# Patient Record
Sex: FEMALE | Race: WHITE | NOT HISPANIC OR LATINO | Employment: PART TIME | URBAN - METROPOLITAN AREA
[De-identification: names, ages, dates, MRNs, and addresses within clinical notes are randomized per-mention and may not be internally consistent; named-entity substitution may affect disease eponyms.]

---

## 2017-02-23 ENCOUNTER — ALLSCRIPTS OFFICE VISIT (OUTPATIENT)
Dept: OTHER | Facility: OTHER | Age: 57
End: 2017-02-23

## 2017-03-13 ENCOUNTER — GENERIC CONVERSION - ENCOUNTER (OUTPATIENT)
Dept: OTHER | Facility: OTHER | Age: 57
End: 2017-03-13

## 2017-05-01 ENCOUNTER — ALLSCRIPTS OFFICE VISIT (OUTPATIENT)
Dept: OTHER | Facility: OTHER | Age: 57
End: 2017-05-01

## 2017-05-01 DIAGNOSIS — R60.0 LOCALIZED EDEMA: ICD-10-CM

## 2017-05-01 DIAGNOSIS — L98.9 DISORDER OF SKIN OR SUBCUTANEOUS TISSUE: ICD-10-CM

## 2017-05-12 ENCOUNTER — HOSPITAL ENCOUNTER (OUTPATIENT)
Dept: RADIOLOGY | Age: 57
Discharge: HOME | End: 2017-05-12
Attending: SURGERY
Payer: COMMERCIAL

## 2017-05-12 ENCOUNTER — GENERIC CONVERSION - ENCOUNTER (OUTPATIENT)
Dept: OTHER | Facility: OTHER | Age: 57
End: 2017-05-12

## 2017-05-12 DIAGNOSIS — L98.9 DISORDER OF SKIN OR SUBCUTANEOUS TISSUE: ICD-10-CM

## 2017-05-12 PROCEDURE — 73718 MRI LOWER EXTREMITY W/O DYE: CPT

## 2017-05-15 ENCOUNTER — ALLSCRIPTS OFFICE VISIT (OUTPATIENT)
Dept: OTHER | Facility: OTHER | Age: 57
End: 2017-05-15

## 2017-05-25 ENCOUNTER — ALLSCRIPTS OFFICE VISIT (OUTPATIENT)
Dept: OTHER | Facility: OTHER | Age: 57
End: 2017-05-25

## 2017-06-07 ENCOUNTER — HOSPITAL ENCOUNTER (OUTPATIENT)
Dept: ULTRASOUND IMAGING | Facility: HOSPITAL | Age: 57
Discharge: HOME | End: 2017-06-07
Payer: COMMERCIAL

## 2017-06-07 DIAGNOSIS — R60.0 LOCALIZED EDEMA: ICD-10-CM

## 2017-06-07 PROCEDURE — 93970 EXTREMITY STUDY: CPT

## 2017-06-08 ENCOUNTER — GENERIC CONVERSION - ENCOUNTER (OUTPATIENT)
Dept: OTHER | Facility: OTHER | Age: 57
End: 2017-06-08

## 2017-06-16 ENCOUNTER — ALLSCRIPTS OFFICE VISIT (OUTPATIENT)
Dept: OTHER | Facility: OTHER | Age: 57
End: 2017-06-16

## 2017-06-16 DIAGNOSIS — R22.42 LOCALIZED SWELLING, MASS AND LUMP, LEFT LOWER LIMB: ICD-10-CM

## 2017-06-16 DIAGNOSIS — R23.4 CHANGES IN SKIN TEXTURE: ICD-10-CM

## 2017-06-22 ENCOUNTER — APPOINTMENT (OUTPATIENT)
Dept: PHYSICAL THERAPY | Facility: CLINIC | Age: 57
End: 2017-06-22
Payer: COMMERCIAL

## 2017-06-22 DIAGNOSIS — R23.4 CHANGES IN SKIN TEXTURE: ICD-10-CM

## 2017-06-22 DIAGNOSIS — R22.42 LOCALIZED SWELLING, MASS AND LUMP, LEFT LOWER LIMB: ICD-10-CM

## 2017-06-22 PROCEDURE — 97140 MANUAL THERAPY 1/> REGIONS: CPT

## 2017-06-22 PROCEDURE — 97161 PT EVAL LOW COMPLEX 20 MIN: CPT

## 2017-06-22 PROCEDURE — 97014 ELECTRIC STIMULATION THERAPY: CPT

## 2017-06-28 ENCOUNTER — APPOINTMENT (OUTPATIENT)
Dept: PHYSICAL THERAPY | Facility: CLINIC | Age: 57
End: 2017-06-28
Payer: COMMERCIAL

## 2017-06-28 ENCOUNTER — GENERIC CONVERSION - ENCOUNTER (OUTPATIENT)
Dept: OTHER | Facility: OTHER | Age: 57
End: 2017-06-28

## 2017-06-30 ENCOUNTER — APPOINTMENT (OUTPATIENT)
Dept: PHYSICAL THERAPY | Facility: CLINIC | Age: 57
End: 2017-06-30
Payer: COMMERCIAL

## 2017-09-11 ENCOUNTER — GENERIC CONVERSION - ENCOUNTER (OUTPATIENT)
Dept: OTHER | Facility: OTHER | Age: 57
End: 2017-09-11

## 2017-10-06 ENCOUNTER — GENERIC CONVERSION - ENCOUNTER (OUTPATIENT)
Dept: OTHER | Facility: OTHER | Age: 57
End: 2017-10-06

## 2017-11-22 ENCOUNTER — ALLSCRIPTS OFFICE VISIT (OUTPATIENT)
Dept: OTHER | Facility: OTHER | Age: 57
End: 2017-11-22

## 2017-11-23 NOTE — PROGRESS NOTES
Assessment    1  Serous otitis media (381 4) (H65 90)    Plan  Serous otitis media    · Amoxicillin 500 MG Oral Capsule; TAKE 1 CAPSULE 3 TIMES DAILY    Discussion/Summary    Patient will be started on amoxicillin and may take ibuprofen or naproxen p r n  [place] Patient encouraged to drink plenty of fluids and rest  Patient to return to the office in 1 week or sooner p r n  [place] Possible side effects of new medications were reviewed with the patient/guardian today  The treatment plan was reviewed with the patient/guardian  The patient/guardian understands and agrees with the treatment plan      Chief Complaint  right ear pain      History of Present Illness  Ear Pain:   Marysol Francisco presents with complaints of right ear pain  Associated symptoms include otalgia,-- ear pressure,-- nasal congestion,-- sore throat-- and-- headache, but-- no ear plugging,-- no ear drainage,-- no decreased hearing,-- no ear pruritus,-- no fever,-- no cough,-- no swollen glands,-- no facial pain,-- no dental pain,-- no tinnitus,-- no vertigo,-- no loss of balance-- and-- no temporomandibular joint pain  HPI: Patient complains of right earache for the past 2 days  She admits to nasal congestion but denies any fever  No treatment by patient  Active Problems  1  Depression (311) (F32 9)   2  Fatigue (780 79) (R53 83)   3  Hammer toe of right foot (735 4) (M20 41)   4  Induration, skin (782 8) (R23 4)   5  Leg edema, left (782 3) (R60 0)   6  Lesion of lower extremity (709 9) (L98 9)   7  Lipoma (214 9) (D17 9)   8  Mass of left lower leg (782 2) (R22 42)   9  Snoring (786 09) (R06 83)   10  Tinea pedis of left foot (110 4) (B35 3)   11  Viral gastroenteritis (008 8) (A08 4)   12  Visit for screening mammogram (V76 12) (Z12 31)    Family History  Mother    1  Family history of arthritis (V17 7) (Z82 61)  Father    2   Family history of hypertension (V17 49) (Z82 49)    Social History   · Never a smoker   · No drug use   · Occasional alcohol use    Surgical History    1  History of Arthroplasty For Hammertoe   2  History of Knee Arthroscopy With Medial Meniscus Repair    Current Meds   1  ZyrTEC Allergy CAPS; Therapy: (Recorded:[id number]) to Recorded    Allergies  1  No Known Drug Allergies    Vitals   Recorded: 22Nov2017 03:17PM Recorded: 22Nov2017 03:03PM   Temperature  97 4 F   Heart Rate 72    Respiration 16    Systolic 876    Diastolic 80    Height  5 ft 6 in   Weight  249 lb    BMI Calculated  40 19   BSA Calculated  2 2       Physical Exam   Constitutional  General appearance: No acute distress, well appearing and well nourished  Eyes  Conjunctiva and lids: No swelling, erythema or discharge  Ears, Nose, Mouth, and Throat  External inspection of ears and nose: Normal    Otoscopic examination: Abnormal   The left tympanic membrane was normal  The right external canal was normal  The left external canal was normal -- Right TM dull  Nasal mucosa, septum, and turbinates: Abnormal   There was a mucoid discharge from both nares  The bilateral nasal mucosa was edematous  Oropharynx: Normal with no erythema, edema, exudate or lesions  Pulmonary  Respiratory effort: No increased work of breathing or signs of respiratory distress  Auscultation of lungs: Clear to auscultation  Cardiovascular  Auscultation of heart: Normal rate and rhythm, normal S1 and S2, without murmurs  Examination of extremities for edema and/or varicosities: Normal    Abdomen  Abdomen: Non-tender, no masses  Lymphatic  Palpation of lymph nodes in neck: No lymphadenopathy  Musculoskeletal  Gait and station: Normal    Skin  Skin and subcutaneous tissue: Normal without rashes or lesions     Psychiatric  Orientation to person, place, and time: Normal    Mood and affect: Normal          Signatures   Electronically signed by : Isi Rios DO; Nov 22 2017  3:25PM EST                       (Author)

## 2018-01-13 VITALS
SYSTOLIC BLOOD PRESSURE: 108 MMHG | RESPIRATION RATE: 16 BRPM | TEMPERATURE: 97.5 F | DIASTOLIC BLOOD PRESSURE: 74 MMHG | HEIGHT: 66 IN | WEIGHT: 243 LBS | HEART RATE: 76 BPM | BODY MASS INDEX: 39.05 KG/M2

## 2018-01-13 VITALS
DIASTOLIC BLOOD PRESSURE: 72 MMHG | BODY MASS INDEX: 40.65 KG/M2 | TEMPERATURE: 97.7 F | HEIGHT: 65 IN | WEIGHT: 244 LBS | SYSTOLIC BLOOD PRESSURE: 100 MMHG

## 2018-01-14 VITALS
TEMPERATURE: 97.7 F | WEIGHT: 252 LBS | SYSTOLIC BLOOD PRESSURE: 130 MMHG | HEIGHT: 66 IN | BODY MASS INDEX: 40.5 KG/M2 | DIASTOLIC BLOOD PRESSURE: 82 MMHG

## 2018-01-14 VITALS
SYSTOLIC BLOOD PRESSURE: 130 MMHG | HEART RATE: 76 BPM | BODY MASS INDEX: 39.93 KG/M2 | TEMPERATURE: 98 F | RESPIRATION RATE: 20 BRPM | WEIGHT: 247.38 LBS | DIASTOLIC BLOOD PRESSURE: 84 MMHG

## 2018-01-14 VITALS
BODY MASS INDEX: 40.02 KG/M2 | DIASTOLIC BLOOD PRESSURE: 80 MMHG | RESPIRATION RATE: 16 BRPM | SYSTOLIC BLOOD PRESSURE: 112 MMHG | HEART RATE: 72 BPM | HEIGHT: 66 IN | WEIGHT: 249 LBS | TEMPERATURE: 97.4 F

## 2018-01-14 VITALS
HEIGHT: 66 IN | BODY MASS INDEX: 40.04 KG/M2 | SYSTOLIC BLOOD PRESSURE: 120 MMHG | DIASTOLIC BLOOD PRESSURE: 77 MMHG | WEIGHT: 249.13 LBS | HEART RATE: 61 BPM

## 2018-01-16 NOTE — MISCELLANEOUS
Message   Recorded as Task   Date: 05/12/2017 12:18 PM, Created By: Moriah Ballesteros   Task Name: Go to Result   Assigned To: [facility],Team   Regarding Patient: Rusty Gross, Status: Active   CommentArik Hall - 12 May 2017 12:18 PM     TASK CREATED  call results  no mass, maybe just bruise or inflammation  see back i office  Erica Beckett - 12 May 2017 1:13 PM     TASK EDITED  Patient call and results were given  Patient has a appointment on Monday 5/15/2017 at 3:15pm with Dr Josh Moreno to discuss MRI results  Active Problems    1  Depression (311) (F32 9)   2  Fatigue (780 79) (R53 83)   3  Hammer toe of right foot (735 4) (M20 41)   4  Lesion of lower extremity (709 9) (L98 9)   5  Lipoma (214 9) (D17 9)   6  Mass of left lower leg (782 2) (R22 42)   7  Snoring (786 09) (R06 83)   8  Tinea pedis of left foot (110 4) (B35 3)   9  Viral gastroenteritis (008 8) (A08 4)   10  Visit for screening mammogram (V76 12) (Z12 31)    Current Meds   1  ZyrTEC Allergy CAPS; Therapy: (Recorded:27Oct2015) to Recorded    Allergies    1   No Known Drug Allergies    Signatures   Electronically signed by : Yasmine Angulo, ; May 12 2017  1:13PM EST                       (Author)

## 2018-01-16 NOTE — RESULT NOTES
Verified Results  VAS LOWER LIMB VENOUS DUPLEX STUDY, COMPLETE BILATERAL 07Jun2017 03:25PM Michelle Avalos Order Number: [id number]    - Patient Instructions: To schedule this appointment, please contact Central Scheduling at [phone]  Test Name Result Flag Reference   VAS LOWER LIMB VENOUS DUPLEX STUDY, COMPLETE BILATERAL (Report)     [facility] REPORT   CLINICAL:   Indications: Localized edema [R60 0]  Patient presents with left lower   extremity edema x 3 years  Clinical:Left Lower Limb   There is edema  FINDINGS:      Segment Right      Left          Impression    Impression       CFV   Normal (Patent) Normal (Patent)             CONCLUSION:   Impression:   RIGHT LOWER LIMB:   No evidence of acute or chronic deep vein thrombosis  No evidence of superficial thrombophlebitis noted  Doppler evaluation shows a normal response to augmentation maneuvers  Popliteal, posterior tibial and anterior tibial arterial Doppler waveforms are   triphasic  LEFT LOWER LIMB:   No evidence of acute or chronic deep vein thrombosis  No evidence of superficial thrombophlebitis noted  Doppler evaluation shows a normal response to augmentation maneuvers  Popliteal, posterior tibial and anterior tibial arterial Doppler waveforms are   triphasic        SIGNATURE:   Electronically Signed by: Jitendra Tolbert MD, [id number]  on 2017-06-07 10:58:28 PM

## 2019-01-15 ENCOUNTER — OFFICE VISIT (OUTPATIENT)
Dept: FAMILY MEDICINE CLINIC | Facility: CLINIC | Age: 59
End: 2019-01-15
Payer: COMMERCIAL

## 2019-01-15 VITALS
SYSTOLIC BLOOD PRESSURE: 126 MMHG | HEIGHT: 65 IN | DIASTOLIC BLOOD PRESSURE: 78 MMHG | TEMPERATURE: 98.1 F | WEIGHT: 248 LBS | BODY MASS INDEX: 41.32 KG/M2

## 2019-01-15 DIAGNOSIS — Z12.39 SCREENING BREAST EXAMINATION: ICD-10-CM
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
## 2019-01-15 DIAGNOSIS — E66.01 CLASS 3 SEVERE OBESITY IN ADULT, UNSPECIFIED BMI, UNSPECIFIED OBESITY TYPE, UNSPECIFIED WHETHER SERIOUS COMORBIDITY PRESENT (HCC): ICD-10-CM

## 2019-01-15 DIAGNOSIS — H53.10 EYE STRAIN, LEFT: Primary | ICD-10-CM

## 2019-01-15 PROCEDURE — 1036F TOBACCO NON-USER: CPT | Performed by: FAMILY MEDICINE

## 2019-01-15 PROCEDURE — 99214 OFFICE O/P EST MOD 30 MIN: CPT | Performed by: FAMILY MEDICINE

## 2019-01-15 PROCEDURE — 3008F BODY MASS INDEX DOCD: CPT | Performed by: FAMILY MEDICINE

## 2019-01-15 PROCEDURE — 86618 LYME DISEASE ANTIBODY: CPT | Performed by: FAMILY MEDICINE

## 2019-01-15 PROCEDURE — 36415 COLL VENOUS BLD VENIPUNCTURE: CPT | Performed by: FAMILY MEDICINE

## 2019-01-15 RX ORDER — AZELASTINE HYDROCHLORIDE 0.5 MG/ML
1 SOLUTION/ DROPS OPHTHALMIC 2 TIMES DAILY
Qty: 6 ML | Refills: 0 | Status: SHIPPED | OUTPATIENT
Start: 2019-01-15 | End: 2019-10-18

## 2019-01-15 NOTE — PROGRESS NOTES
Assessment/Plan:  No significant physical findings today  We discussed treatment options  Recommend fasting blood testing  Also recommended Lyme titer today  Await results  Treat if positive  A start allergy eyedrops if no improvement in symptoms  Also recommend ophthalmology evaluation if symptoms return  Card given for 2 ophthalmology groups locally  She will call if any new or worsening symptoms  We reviewed Lyme disease symptoms  Patient would also like to follow up with weight    Referral to weight management sent  No problem-specific Assessment & Plan notes found for this encounter  Diagnoses and all orders for this visit:    Eye strain, left  -     azelastine (OPTIVAR) 0 05 % ophthalmic solution; Administer 1 drop into the left eye 2 (two) times a day  -     Lyme Antibody Profile with reflex to WB; Future  -     Lyme Antibody Profile with reflex to WB    Screening breast examination    Class 3 severe obesity in adult, unspecified BMI, unspecified obesity type, unspecified whether serious comorbidity present (HCC)  -     CBC and differential  -     Comprehensive metabolic panel  -     Lipid Panel with Direct LDL reflex  -     TSH, 3rd generation with Free T4 reflex; Future    Tick bite, initial encounter    Other orders  -     Cancel: Mammo screening bilateral w 3d & cad; Future          Subjective:      Patient ID: Berhane Méndez is a 66 y o  female  Patient here with multiple concerns  She had left side eye irritation for several weeks that has since cleared over the last few days  She does recall a tick bite a few weeks ago to the left posterior leg  She states there is a rash there that since resolved  Denies any fever sweats or chills  Rash was about the size of a quarter at the site of the tick bite  No bull's-eye rash  No arthralgias  Eye Pain    Associated symptoms include itching  Pertinent negatives include no photophobia         The following portions of the patient's history were reviewed and updated as appropriate: allergies, current medications, past family history, past medical history, past social history, past surgical history and problem list     Review of Systems   Eyes: Positive for itching  Negative for photophobia, pain and visual disturbance  Skin: Positive for rash (As noted in HPI)  Objective:      /78 (BP Location: Left arm, Patient Position: Sitting, Cuff Size: Adult)   Temp 98 1 °F (36 7 °C)   Ht 5' 5 35" (1 66 m)   Wt 112 kg (248 lb)   BMI 40 82 kg/m²          Physical Exam   Constitutional: She is oriented to person, place, and time  She appears well-developed and well-nourished  HENT:   Head: Normocephalic and atraumatic  Right Ear: External ear normal  Tympanic membrane is not erythematous and not bulging  Left Ear: External ear normal  Tympanic membrane is not erythematous and not bulging  Nose: Nose normal    Mouth/Throat: Oropharynx is clear and moist and mucous membranes are normal  No oral lesions  No oropharyngeal exudate  Eyes: Conjunctivae and EOM are normal  Right eye exhibits no discharge  Left eye exhibits no discharge  No scleral icterus  Neck: Normal range of motion  Neck supple  No thyromegaly present  Cardiovascular: Normal rate, regular rhythm and normal heart sounds  Exam reveals no gallop and no friction rub  No murmur heard  Pulmonary/Chest: Effort normal  No respiratory distress  She has no wheezes  She has no rales  She exhibits no tenderness  Abdominal: Soft  Bowel sounds are normal  She exhibits no distension and no mass  There is no tenderness  There is no rebound and no guarding  Musculoskeletal: Normal range of motion  She exhibits no edema, tenderness or deformity  Lymphadenopathy:     She has no cervical adenopathy  Neurological: She is alert and oriented to person, place, and time  She has normal reflexes  No cranial nerve deficit   She exhibits normal muscle tone  Coordination normal    Skin: Skin is warm and dry  No rash noted  No erythema  No pallor  Psychiatric: She has a normal mood and affect  Her behavior is normal    Vitals reviewed

## 2019-01-17 LAB
B BURGDOR IGG SER IA-ACNC: 0.21
B BURGDOR IGM SER IA-ACNC: 0.56

## 2019-10-18 ENCOUNTER — OFFICE VISIT (OUTPATIENT)
Dept: FAMILY MEDICINE CLINIC | Facility: CLINIC | Age: 59
End: 2019-10-18
Payer: COMMERCIAL

## 2019-10-18 VITALS
TEMPERATURE: 98.8 F | HEART RATE: 72 BPM | RESPIRATION RATE: 16 BRPM | DIASTOLIC BLOOD PRESSURE: 80 MMHG | SYSTOLIC BLOOD PRESSURE: 116 MMHG

## 2019-10-18 DIAGNOSIS — J32.9 SINUSITIS, UNSPECIFIED CHRONICITY, UNSPECIFIED LOCATION: Primary | ICD-10-CM

## 2019-10-18 PROCEDURE — 99213 OFFICE O/P EST LOW 20 MIN: CPT | Performed by: FAMILY MEDICINE

## 2019-10-18 RX ORDER — AZITHROMYCIN 250 MG/1
TABLET, FILM COATED ORAL
Qty: 6 TABLET | Refills: 0 | Status: SHIPPED | OUTPATIENT
Start: 2019-10-18 | End: 2019-10-22

## 2019-10-18 NOTE — PROGRESS NOTES
Assessment/Plan:  Patient will be started on a Z-Noam and may continue Mucinex p r n  Unk Lauren She is encouraged to drink plenty of fluids and rest   Recommend referral to ophthalmologist for ongoing left eye pain  Return to the office in 1 week or sooner p r n  Unk Lauren Diagnoses and all orders for this visit:    Sinusitis, unspecified chronicity, unspecified location  -     azithromycin (ZITHROMAX) 250 mg tablet; Take 2 tablets today then 1 tablet daily x 4 days          Subjective:      Patient ID: Rey Marie is a 66 y o  female  Patient started with cold symptoms 4 weeks ago and now complains of nasal congestion, sore throat and cough productive of greenish mucus  She admits to headache but denies fever  She has treated this with Mucinex with some relief  She complains of ongoing left eye pain and never got prescription filled for eyedrops  URI    This is a new problem  The current episode started 1 to 4 weeks ago  The problem has been gradually worsening  There has been no fever  Associated symptoms include congestion, coughing, headaches, a plugged ear sensation, rhinorrhea, sneezing, a sore throat and wheezing  Pertinent negatives include no ear pain or sinus pain  She has tried increased fluids (mucinex) for the symptoms  The treatment provided mild relief  The following portions of the patient's history were reviewed and updated as appropriate: allergies, current medications, past family history, past medical history, past social history, past surgical history and problem list     Review of Systems   HENT: Positive for congestion, rhinorrhea, sneezing and sore throat  Negative for ear pain and sinus pain  Respiratory: Positive for cough and wheezing  Neurological: Positive for headaches           Objective:      /80 (BP Location: Left arm, Patient Position: Sitting, Cuff Size: Large)   Pulse 72   Temp 98 8 °F (37 1 °C) (Tympanic)   Resp 16          Physical Exam   Constitutional: She is oriented to person, place, and time  She appears well-developed and well-nourished  HENT:   Head: Normocephalic  Right Ear: External ear normal    Left Ear: External ear normal    Turbinates swelling with mucoid drainage  Throat postnasal drainage and injected  Eyes: Pupils are equal, round, and reactive to light  Conjunctivae and EOM are normal  Right eye exhibits no discharge  Left eye exhibits no discharge  No scleral icterus  Neck: Neck supple  Cardiovascular: Normal rate and regular rhythm  Pulmonary/Chest: Effort normal and breath sounds normal    Abdominal: Soft  There is no tenderness  Musculoskeletal: She exhibits no edema  Lymphadenopathy:     She has no cervical adenopathy  Neurological: She is alert and oriented to person, place, and time  Skin: Skin is warm and dry  Psychiatric: She has a normal mood and affect

## 2019-10-21 ENCOUNTER — TELEPHONE (OUTPATIENT)
Dept: FAMILY MEDICINE CLINIC | Facility: CLINIC | Age: 59
End: 2019-10-21

## 2019-10-21 DIAGNOSIS — H57.12 LEFT EYE PAIN: Primary | ICD-10-CM

## 2019-10-23 ENCOUNTER — CLINICAL SUPPORT (OUTPATIENT)
Dept: FAMILY MEDICINE CLINIC | Facility: CLINIC | Age: 59
End: 2019-10-23
Payer: COMMERCIAL

## 2019-10-23 DIAGNOSIS — Z23 NEED FOR INFLUENZA VACCINATION: Primary | ICD-10-CM

## 2019-10-23 DIAGNOSIS — E66.01 CLASS 3 SEVERE OBESITY IN ADULT, UNSPECIFIED BMI, UNSPECIFIED OBESITY TYPE, UNSPECIFIED WHETHER SERIOUS COMORBIDITY PRESENT (HCC): ICD-10-CM

## 2019-10-23 LAB
ALBUMIN SERPL BCP-MCNC: 4.3 G/DL (ref 3.5–5)
ALP SERPL-CCNC: 91 U/L (ref 46–116)
ALT SERPL W P-5'-P-CCNC: 21 U/L (ref 12–78)
ANION GAP SERPL CALCULATED.3IONS-SCNC: 6 MMOL/L (ref 4–13)
AST SERPL W P-5'-P-CCNC: 13 U/L (ref 5–45)
BASOPHILS # BLD AUTO: 0.08 THOUSANDS/ΜL (ref 0–0.1)
BASOPHILS NFR BLD AUTO: 1 % (ref 0–1)
BILIRUB SERPL-MCNC: 0.39 MG/DL (ref 0.2–1)
BUN SERPL-MCNC: 14 MG/DL (ref 5–25)
CALCIUM SERPL-MCNC: 9.9 MG/DL (ref 8.3–10.1)
CHLORIDE SERPL-SCNC: 107 MMOL/L (ref 100–108)
CHOLEST SERPL-MCNC: 199 MG/DL (ref 50–200)
CO2 SERPL-SCNC: 27 MMOL/L (ref 21–32)
CREAT SERPL-MCNC: 0.97 MG/DL (ref 0.6–1.3)
EOSINOPHIL # BLD AUTO: 0.11 THOUSAND/ΜL (ref 0–0.61)
EOSINOPHIL NFR BLD AUTO: 2 % (ref 0–6)
ERYTHROCYTE [DISTWIDTH] IN BLOOD BY AUTOMATED COUNT: 13.9 % (ref 11.6–15.1)
GFR SERPL CREATININE-BSD FRML MDRD: 65 ML/MIN/1.73SQ M
GLUCOSE SERPL-MCNC: 85 MG/DL (ref 65–140)
HCT VFR BLD AUTO: 41.8 % (ref 34.8–46.1)
HDLC SERPL-MCNC: 80 MG/DL
HGB BLD-MCNC: 13.3 G/DL (ref 11.5–15.4)
IMM GRANULOCYTES # BLD AUTO: 0.01 THOUSAND/UL (ref 0–0.2)
IMM GRANULOCYTES NFR BLD AUTO: 0 % (ref 0–2)
LDLC SERPL CALC-MCNC: 101 MG/DL (ref 0–100)
LYMPHOCYTES # BLD AUTO: 2.24 THOUSANDS/ΜL (ref 0.6–4.47)
LYMPHOCYTES NFR BLD AUTO: 33 % (ref 14–44)
MCH RBC QN AUTO: 28.6 PG (ref 26.8–34.3)
MCHC RBC AUTO-ENTMCNC: 31.8 G/DL (ref 31.4–37.4)
MCV RBC AUTO: 90 FL (ref 82–98)
MONOCYTES # BLD AUTO: 0.55 THOUSAND/ΜL (ref 0.17–1.22)
MONOCYTES NFR BLD AUTO: 8 % (ref 4–12)
NEUTROPHILS # BLD AUTO: 3.83 THOUSANDS/ΜL (ref 1.85–7.62)
NEUTS SEG NFR BLD AUTO: 56 % (ref 43–75)
NRBC BLD AUTO-RTO: 0 /100 WBCS
PLATELET # BLD AUTO: 240 THOUSANDS/UL (ref 149–390)
PMV BLD AUTO: 11.2 FL (ref 8.9–12.7)
POTASSIUM SERPL-SCNC: 4.4 MMOL/L (ref 3.5–5.3)
PROT SERPL-MCNC: 7.7 G/DL (ref 6.4–8.2)
RBC # BLD AUTO: 4.65 MILLION/UL (ref 3.81–5.12)
SODIUM SERPL-SCNC: 140 MMOL/L (ref 136–145)
TRIGL SERPL-MCNC: 88 MG/DL
TSH SERPL DL<=0.05 MIU/L-ACNC: 1.63 UIU/ML (ref 0.36–3.74)
WBC # BLD AUTO: 6.82 THOUSAND/UL (ref 4.31–10.16)

## 2019-10-23 PROCEDURE — 85025 COMPLETE CBC W/AUTO DIFF WBC: CPT | Performed by: FAMILY MEDICINE

## 2019-10-23 PROCEDURE — 90471 IMMUNIZATION ADMIN: CPT

## 2019-10-23 PROCEDURE — 90682 RIV4 VACC RECOMBINANT DNA IM: CPT

## 2019-10-23 PROCEDURE — 36415 COLL VENOUS BLD VENIPUNCTURE: CPT | Performed by: FAMILY MEDICINE

## 2019-10-23 PROCEDURE — 80061 LIPID PANEL: CPT | Performed by: FAMILY MEDICINE

## 2019-10-23 PROCEDURE — 80053 COMPREHEN METABOLIC PANEL: CPT | Performed by: FAMILY MEDICINE

## 2019-10-23 PROCEDURE — 84443 ASSAY THYROID STIM HORMONE: CPT | Performed by: FAMILY MEDICINE

## 2019-10-28 ENCOUNTER — OFFICE VISIT (OUTPATIENT)
Dept: FAMILY MEDICINE CLINIC | Facility: CLINIC | Age: 59
End: 2019-10-28
Payer: COMMERCIAL

## 2019-10-28 VITALS
HEART RATE: 84 BPM | OXYGEN SATURATION: 97 % | BODY MASS INDEX: 36.96 KG/M2 | TEMPERATURE: 97.8 F | HEIGHT: 66 IN | WEIGHT: 230 LBS | DIASTOLIC BLOOD PRESSURE: 64 MMHG | SYSTOLIC BLOOD PRESSURE: 112 MMHG

## 2019-10-28 DIAGNOSIS — Z00.00 WELL ADULT EXAM: Primary | ICD-10-CM

## 2019-10-28 DIAGNOSIS — Z13.820 OSTEOPOROSIS SCREENING: ICD-10-CM

## 2019-10-28 PROCEDURE — 99396 PREV VISIT EST AGE 40-64: CPT | Performed by: FAMILY MEDICINE

## 2019-10-28 NOTE — PROGRESS NOTES
Assessment/Plan:  Anticipatory guidance provided  Recommend annual mammography  Return to office for recheck in 1 year  Sooner if needed  DEXA scan ordered  No problem-specific Assessment & Plan notes found for this encounter  Diagnoses and all orders for this visit:    Well adult exam    Osteoporosis screening  -     DXA bone density spine hip and pelvis; Future          Subjective:      Patient ID: Jared Baeza is a 66 y o  female  Patient is here for annual well check  She is generally feeling well  She would like a DEXA scan done as she believes her mother may have osteoporosis  Patient follows with a gynecologist regularly  The following portions of the patient's history were reviewed and updated as appropriate: allergies, current medications, past family history, past medical history, past social history, past surgical history and problem list     Review of Systems   Constitutional: Negative  HENT: Negative  Eyes: Negative  Respiratory: Negative  Cardiovascular: Negative  Gastrointestinal: Negative  Endocrine: Negative  Genitourinary: Negative  Musculoskeletal: Negative  Skin: Negative  Allergic/Immunologic: Negative  Neurological: Negative  Hematological: Negative  Psychiatric/Behavioral: Negative  Objective:      /64 (BP Location: Left arm, Patient Position: Sitting, Cuff Size: Standard)   Pulse 84   Temp 97 8 °F (36 6 °C) (Tympanic)   Ht 5' 5 55" (1 665 m)   Wt 104 kg (230 lb)   LMP  (LMP Unknown)   SpO2 97%   BMI 37 63 kg/m²          Physical Exam   Constitutional: She is oriented to person, place, and time  She appears well-developed and well-nourished  HENT:   Head: Normocephalic and atraumatic  Right Ear: External ear normal  Tympanic membrane is not erythematous and not bulging  Left Ear: External ear normal  Tympanic membrane is not erythematous and not bulging     Nose: Nose normal    Mouth/Throat: Oropharynx is clear and moist and mucous membranes are normal  No oral lesions  No oropharyngeal exudate  Eyes: Conjunctivae and EOM are normal  Right eye exhibits no discharge  Left eye exhibits no discharge  No scleral icterus  Neck: Normal range of motion  Neck supple  No thyromegaly present  Cardiovascular: Normal rate, regular rhythm and normal heart sounds  Exam reveals no gallop and no friction rub  No murmur heard  Pulmonary/Chest: Effort normal  No respiratory distress  She has no wheezes  She has no rales  She exhibits no tenderness  Abdominal: Soft  Bowel sounds are normal  She exhibits no distension and no mass  There is no tenderness  There is no rebound and no guarding  Musculoskeletal: Normal range of motion  She exhibits no edema, tenderness or deformity  Lymphadenopathy:     She has no cervical adenopathy  Neurological: She is alert and oriented to person, place, and time  She has normal reflexes  No cranial nerve deficit  She exhibits normal muscle tone  Coordination normal    Skin: Skin is warm and dry  No rash noted  No erythema  No pallor  Psychiatric: She has a normal mood and affect  Her behavior is normal    Vitals reviewed

## 2019-10-28 NOTE — PROGRESS NOTES
BMI Counseling: Body mass index is 37 63 kg/m²  The BMI is above normal  Nutrition recommendations include reducing portion sizes

## 2020-05-12 NOTE — TELEPHONE ENCOUNTER
Patient will be coming in for ordering labs on wed 10/23/2019, she will also  dr summers for ophthalmologist, she will be seeing Danica Petersen at [address] 101 left pending  98.4

## 2021-01-28 ENCOUNTER — DOCTOR'S OFFICE (OUTPATIENT)
Dept: URBAN - METROPOLITAN AREA CLINIC 136 | Facility: CLINIC | Age: 61
Setting detail: OPHTHALMOLOGY
End: 2021-01-28
Payer: COMMERCIAL

## 2021-01-28 DIAGNOSIS — H02.433: ICD-10-CM

## 2021-01-28 DIAGNOSIS — H02.831: ICD-10-CM

## 2021-01-28 DIAGNOSIS — H25.13: ICD-10-CM

## 2021-01-28 DIAGNOSIS — H02.834: ICD-10-CM

## 2021-01-28 PROCEDURE — 92002 INTRM OPH EXAM NEW PATIENT: CPT | Performed by: OPHTHALMOLOGY

## 2021-01-28 PROCEDURE — 92285 EXTERNAL OCULAR PHOTOGRAPHY: CPT | Performed by: OPHTHALMOLOGY

## 2021-01-28 ASSESSMENT — CONFRONTATIONAL VISUAL FIELD TEST (CVF)
OS_FINDINGS: FULL
OD_FINDINGS: FULL

## 2021-01-28 ASSESSMENT — LID POSITION - PTOSIS
OD_PTOSIS: RUL T
OS_PTOSIS: LUL T

## 2021-01-28 ASSESSMENT — VISUAL ACUITY
OS_BCVA: 20/30+2
OD_BCVA: 20/30+1

## 2021-01-28 ASSESSMENT — TONOMETRY
OD_IOP_MMHG: 14
OS_IOP_MMHG: 13

## 2021-01-28 ASSESSMENT — LID POSITION - DERMATOCHALASIS
OS_DERMATOCHALASIS: LUL 2+
OD_DERMATOCHALASIS: RUL 2+

## 2021-02-03 ENCOUNTER — IMMUNIZATIONS (OUTPATIENT)
Dept: FAMILY MEDICINE CLINIC | Facility: HOSPITAL | Age: 61
End: 2021-02-03

## 2021-02-03 DIAGNOSIS — Z23 ENCOUNTER FOR IMMUNIZATION: Primary | ICD-10-CM

## 2021-02-03 PROCEDURE — 0001A SARS-COV-2 / COVID-19 MRNA VACCINE (PFIZER-BIONTECH) 30 MCG: CPT

## 2021-02-03 PROCEDURE — 91300 SARS-COV-2 / COVID-19 MRNA VACCINE (PFIZER-BIONTECH) 30 MCG: CPT

## 2021-02-24 ENCOUNTER — IMMUNIZATIONS (OUTPATIENT)
Dept: FAMILY MEDICINE CLINIC | Facility: HOSPITAL | Age: 61
End: 2021-02-24

## 2021-02-24 DIAGNOSIS — Z23 ENCOUNTER FOR IMMUNIZATION: Primary | ICD-10-CM

## 2021-02-24 PROCEDURE — 0002A SARS-COV-2 / COVID-19 MRNA VACCINE (PFIZER-BIONTECH) 30 MCG: CPT

## 2021-02-24 PROCEDURE — 91300 SARS-COV-2 / COVID-19 MRNA VACCINE (PFIZER-BIONTECH) 30 MCG: CPT

## 2021-04-26 NOTE — RESULT ENCOUNTER NOTE
PHYSICIAN NEXT STEPS:  Review Only    CHIEF COMPLAINT:  Chief Complaint/Protocol Used: Neurologic Deficit  Onset: Right side of face is numb      ASSESSMENT:  ? Onset: Right side of face is numb  ? Symptom: Right side of face is numb  ? Onset: This morning when she woke up and felt numbness by the mouth  ? PATTERN \"Does this come and go, or has it been constant since it started?\" \"Is it present now?\" Constant  ? Neurologic Symptoms: Headache for few days not now  ? Other Symptoms: Migraines  -------------------------------------------------------    DISPOSITION:  Disposition Recommendation: Call  Now  Questions that led to disposition:  ? [1] Numbness (i.e., loss of sensation) of the face, arm / hand, or leg / foot on one side of the body AND [2] sudden onset AND [3] present now  Patient Directed To: Unspecified  Patient Intended Action: Call 911      DISPOSITION OVERRIDE/PROVIDER CONSULT:  Disposition Override: N/A  Override Source: Unspecified  Consulted with PCP: No  Consulted with On-Call Physician: No    CALLER CONTACT INFO:  Name: Ceci Woods (Self)  Phone 1: [phone] (Home Phone)  Phone 2: [phone] (Mobile)      ENCOUNTER STARTED:  04/26/21 06:10:58 AM  ENCOUNTER ASSIGNED TO/CLOSED BY:  Aileen Low @ 04/26/21 06:17:01 AM      -------------------------------------------------------    CARE ADVICE given per Neurologic Deficit guideline.  CALL  NOW: Immediate medical attention is needed. You need to hang up and call 911 (or an ambulance). ([facility] Discretion: I'll call you back in a few minutes to be sure you were able to reach them.)      UNDERSTANDS CARE ADVICE: Yes    AGREES WITH CARE ADVICE: Yes    WILL FOLLOW CARE ADVICE: Yes    -------------------------------------------------------   Please call patient and inform them that mammogram is normal   Recommend recheck mammogram in 1 year

## 2021-05-18 ENCOUNTER — RA CDI HCC (OUTPATIENT)
Dept: OTHER | Facility: HOSPITAL | Age: 61
End: 2021-05-18

## 2021-05-18 NOTE — PROGRESS NOTES
[facility]  coding opportunities          Chart reviewed, no opportunity found: CHART REVIEWED, NO OPPORTUNITY FOUND              Patients insurance company: [employer] (Medicare Advantage and Commercial)

## 2021-05-25 ENCOUNTER — CONSULT (OUTPATIENT)
Dept: FAMILY MEDICINE CLINIC | Facility: CLINIC | Age: 61
End: 2021-05-25
Payer: COMMERCIAL

## 2021-05-25 VITALS
SYSTOLIC BLOOD PRESSURE: 123 MMHG | DIASTOLIC BLOOD PRESSURE: 76 MMHG | BODY MASS INDEX: 38.18 KG/M2 | HEART RATE: 80 BPM | HEIGHT: 66 IN | WEIGHT: 237.6 LBS | OXYGEN SATURATION: 92 % | RESPIRATION RATE: 15 BRPM | TEMPERATURE: 96.5 F

## 2021-05-25 DIAGNOSIS — Z01.818 PREOP EXAMINATION: Primary | ICD-10-CM

## 2021-05-25 DIAGNOSIS — R53.83 FATIGUE, UNSPECIFIED TYPE: ICD-10-CM

## 2021-05-25 PROBLEM — M41.20 IDIOPATHIC SCOLIOSIS: Status: ACTIVE | Noted: 2021-05-25

## 2021-05-25 PROCEDURE — 1036F TOBACCO NON-USER: CPT | Performed by: FAMILY MEDICINE

## 2021-05-25 PROCEDURE — 3008F BODY MASS INDEX DOCD: CPT | Performed by: FAMILY MEDICINE

## 2021-05-25 PROCEDURE — 99214 OFFICE O/P EST MOD 30 MIN: CPT | Performed by: FAMILY MEDICINE

## 2021-05-25 PROCEDURE — 3725F SCREEN DEPRESSION PERFORMED: CPT | Performed by: FAMILY MEDICINE

## 2021-05-25 NOTE — PROGRESS NOTES
BMI Counseling: Body mass index is 38 35 kg/m²  The BMI is above normal  Nutrition recommendations include reducing portion sizes  Assessment/Plan: patient is cleared for surgery  She would like to have Lyme testing done and blood testing for routine care  1  Preop examination  -     CBC and differential  -     Comprehensive metabolic panel  -     Lipid Panel with Direct LDL reflex  -     Hemoglobin A1C    2  Fatigue, unspecified type  -     Vitamin B12; Future  -     Lyme Antibody Profile with reflex to WB; Future          Subjective:      Patient ID: Fabien Pastrana is a 64 y o  female  Patient here for preoperative clearance  Generally feeling well  No concerns or complaints today  The following portions of the patient's history were reviewed and updated as appropriate: allergies, current medications, past family history, past medical history, past social history, past surgical history, and problem list     Review of Systems   Constitutional: Negative  HENT: Negative  Eyes: Negative  Respiratory: Negative  Cardiovascular: Negative  Gastrointestinal: Negative  Endocrine: Negative  Genitourinary: Negative  Musculoskeletal: Negative  Skin: Negative  Allergic/Immunologic: Negative  Neurological: Negative  Hematological: Negative  Psychiatric/Behavioral: Negative  Objective:      /76 (BP Location: Left arm, Patient Position: Sitting) Comment (Cuff Size): fit bit on wrist  Pulse 80   Temp (!) 96 5 °F (35 8 °C) (Temporal)   Resp 15   Ht 5' 6" (1 676 m)   Wt 108 kg (237 lb 9 6 oz)   LMP  (LMP Unknown)   SpO2 92%   BMI 38 35 kg/m²          Physical Exam  Vitals signs reviewed  Constitutional:       Appearance: She is well-developed  HENT:      Head: Normocephalic and atraumatic  Right Ear: External ear normal  Tympanic membrane is not erythematous or bulging        Left Ear: External ear normal  Tympanic membrane is not erythematous or bulging  Nose: Nose normal       Mouth/Throat:      Mouth: No oral lesions  Pharynx: No oropharyngeal exudate  Eyes:      General: No scleral icterus  Right eye: No discharge  Left eye: No discharge  Conjunctiva/sclera: Conjunctivae normal       Comments:   Bilateral upper eyelid ptosis   Neck:      Musculoskeletal: Normal range of motion and neck supple  Thyroid: No thyromegaly  Cardiovascular:      Rate and Rhythm: Normal rate and regular rhythm  Heart sounds: Normal heart sounds  No murmur  No friction rub  No gallop  Pulmonary:      Effort: Pulmonary effort is normal  No respiratory distress  Breath sounds: No wheezing or rales  Chest:      Chest wall: No tenderness  Abdominal:      General: Bowel sounds are normal  There is no distension  Palpations: Abdomen is soft  There is no mass  Tenderness: There is no abdominal tenderness  There is no guarding or rebound  Musculoskeletal: Normal range of motion  General: No tenderness or deformity  Lymphadenopathy:      Cervical: No cervical adenopathy  Skin:     General: Skin is warm and dry  Coloration: Skin is not pale  Findings: No erythema or rash  Neurological:      Mental Status: She is alert and oriented to person, place, and time  Cranial Nerves: No cranial nerve deficit  Motor: No abnormal muscle tone  Coordination: Coordination normal       Deep Tendon Reflexes: Reflexes are normal and symmetric     Psychiatric:         Behavior: Behavior normal

## 2021-06-02 ENCOUNTER — TELEPHONE (OUTPATIENT)
Dept: FAMILY MEDICINE CLINIC | Facility: CLINIC | Age: 61
End: 2021-06-02

## 2021-06-02 NOTE — TELEPHONE ENCOUNTER
Info faxed to [facility] fax#[phone]:  Last note, pre-op clearance form, meds & allergies and active problem list

## 2021-06-02 NOTE — TELEPHONE ENCOUNTER
Jaleesa from [facility] called asking for OV note from 5/25 for her pre-op, but note is not signed  Can you please sign this note so I can fax it over? Thank you

## 2021-06-23 ENCOUNTER — OFFICE VISIT (OUTPATIENT)
Dept: FAMILY MEDICINE CLINIC | Facility: CLINIC | Age: 61
End: 2021-06-23
Payer: COMMERCIAL

## 2021-06-23 VITALS
TEMPERATURE: 97.8 F | WEIGHT: 231.8 LBS | DIASTOLIC BLOOD PRESSURE: 78 MMHG | SYSTOLIC BLOOD PRESSURE: 124 MMHG | RESPIRATION RATE: 16 BRPM | BODY MASS INDEX: 37.25 KG/M2 | HEART RATE: 84 BPM | OXYGEN SATURATION: 98 % | HEIGHT: 66 IN

## 2021-06-23 DIAGNOSIS — H66.90 ACUTE OTITIS MEDIA, UNSPECIFIED OTITIS MEDIA TYPE: Primary | ICD-10-CM

## 2021-06-23 PROCEDURE — 99213 OFFICE O/P EST LOW 20 MIN: CPT | Performed by: FAMILY MEDICINE

## 2021-06-23 PROCEDURE — 1036F TOBACCO NON-USER: CPT | Performed by: FAMILY MEDICINE

## 2021-06-23 PROCEDURE — 3008F BODY MASS INDEX DOCD: CPT | Performed by: FAMILY MEDICINE

## 2021-06-23 RX ORDER — NEOMYCIN SULFATE, POLYMYXIN B SULFATE, AND DEXAMETHASONE 3.5; 10000; 1 MG/G; [USP'U]/G; MG/G
OINTMENT OPHTHALMIC
COMMUNITY
Start: 2021-06-16

## 2021-06-23 RX ORDER — AZITHROMYCIN 250 MG/1
TABLET, FILM COATED ORAL
Qty: 6 TABLET | Refills: 0 | Status: SHIPPED | OUTPATIENT
Start: 2021-06-23 | End: 2021-06-27

## 2021-06-23 NOTE — PROGRESS NOTES
Assessment/Plan:    Patient will be started on a Z-Noam and instructed to take Claritin and Flonase daily  Recommend increase fluids and rest   Return the office in 1 week or call sooner sushil Billy Diagnoses and all orders for this visit:    Acute otitis media, unspecified otitis media type  -     azithromycin (ZITHROMAX) 250 mg tablet; Take 2 tablets today then 1 tablet daily x 4 days    Other orders  -     neomycin-polymyxin-dexamethasone (MAXITROL) 0 35%-10,000 units/g-0 1%          Subjective:      Patient ID: Amy Taylor is a 64 y o  female  Patient complains of right ear pain for the past few days  She admits to nasal congestion, postnasal drainage and mild sore throat and occasional cough  Patient denies fever  No treatment by patient  Earache   There is pain in the right ear  The current episode started in the past 7 days  The problem has been unchanged  There has been no fever  Associated symptoms include coughing and a sore throat  Pertinent negatives include no ear discharge, headaches, hearing loss or rhinorrhea  She has tried nothing for the symptoms  There is no history of a chronic ear infection, hearing loss or a tympanostomy tube  The following portions of the patient's history were reviewed and updated as appropriate: allergies, current medications, past family history, past medical history, past social history, past surgical history and problem list     Review of Systems   HENT: Positive for ear pain and sore throat  Negative for ear discharge, hearing loss and rhinorrhea  Respiratory: Positive for cough  Neurological: Negative for headaches           Objective:      /78 (BP Location: Left arm, Patient Position: Sitting, Cuff Size: Large)   Pulse 84   Temp 97 8 °F (36 6 °C) (Temporal)   Resp 16   Ht 5' 5 75" (1 67 m)   Wt 105 kg (231 lb 12 8 oz)   LMP  (LMP Unknown)   SpO2 98%   BMI 37 70 kg/m²          Physical Exam  Constitutional:       General: She is not in acute distress  Appearance: Normal appearance  She is not ill-appearing, toxic-appearing or diaphoretic  HENT:      Head: Normocephalic  Right Ear: Ear canal normal       Left Ear: Tympanic membrane and ear canal normal       Ears:      Comments: Right TM mildly injected     Nose: Congestion present  Mouth/Throat:      Mouth: Mucous membranes are moist       Pharynx: Oropharynx is clear  Eyes:      General: No scleral icterus  Conjunctiva/sclera: Conjunctivae normal    Cardiovascular:      Rate and Rhythm: Normal rate and regular rhythm  Pulmonary:      Effort: Pulmonary effort is normal       Breath sounds: Normal breath sounds  Musculoskeletal:      Cervical back: Neck supple  Right lower leg: No edema  Left lower leg: No edema  Lymphadenopathy:      Cervical: No cervical adenopathy  Skin:     General: Skin is warm and dry  Neurological:      Mental Status: She is alert and oriented to person, place, and time     Psychiatric:         Mood and Affect: Mood normal

## 2021-07-21 ENCOUNTER — TELEPHONE (OUTPATIENT)
Dept: FAMILY MEDICINE CLINIC | Facility: CLINIC | Age: 61
End: 2021-07-21

## 2021-07-21 NOTE — TELEPHONE ENCOUNTER
Pt is considering starting Dr [facility] (he is a chiropractor in [place])  Pt would like your thoughts on this   Please advise    Thank you

## 2021-07-21 NOTE — TELEPHONE ENCOUNTER
LMOM with providers response below per communication form  Advised pt to call back with any questions

## 2021-10-22 ENCOUNTER — VBI (OUTPATIENT)
Dept: ADMINISTRATIVE | Facility: OTHER | Age: 61
End: 2021-10-22

## 2021-10-29 ENCOUNTER — CLINICAL SUPPORT (OUTPATIENT)
Dept: FAMILY MEDICINE CLINIC | Facility: CLINIC | Age: 61
End: 2021-10-29
Payer: COMMERCIAL

## 2021-10-29 DIAGNOSIS — Z23 INFLUENZA VACCINATION GIVEN: Primary | ICD-10-CM

## 2021-10-29 DIAGNOSIS — Z23 IMMUNIZATION DUE: ICD-10-CM

## 2021-10-29 PROCEDURE — 90471 IMMUNIZATION ADMIN: CPT

## 2021-10-29 PROCEDURE — 90750 HZV VACC RECOMBINANT IM: CPT

## 2021-10-29 PROCEDURE — 90472 IMMUNIZATION ADMIN EACH ADD: CPT

## 2021-10-29 PROCEDURE — 90682 RIV4 VACC RECOMBINANT DNA IM: CPT

## 2022-03-08 ENCOUNTER — OFFICE VISIT (OUTPATIENT)
Dept: FAMILY MEDICINE CLINIC | Facility: CLINIC | Age: 62
End: 2022-03-08
Payer: COMMERCIAL

## 2022-03-08 VITALS
BODY MASS INDEX: 38.09 KG/M2 | TEMPERATURE: 97.8 F | HEIGHT: 66 IN | OXYGEN SATURATION: 92 % | WEIGHT: 237 LBS | HEART RATE: 80 BPM

## 2022-03-08 DIAGNOSIS — R53.82 CHRONIC FATIGUE: Primary | ICD-10-CM

## 2022-03-08 DIAGNOSIS — M79.671 RIGHT FOOT PAIN: ICD-10-CM

## 2022-03-08 DIAGNOSIS — L65.9 HAIR LOSS: ICD-10-CM

## 2022-03-08 PROCEDURE — 3008F BODY MASS INDEX DOCD: CPT | Performed by: FAMILY MEDICINE

## 2022-03-08 PROCEDURE — 1036F TOBACCO NON-USER: CPT | Performed by: FAMILY MEDICINE

## 2022-03-08 PROCEDURE — 99213 OFFICE O/P EST LOW 20 MIN: CPT | Performed by: FAMILY MEDICINE

## 2022-03-09 ENCOUNTER — APPOINTMENT (OUTPATIENT)
Dept: RADIOLOGY | Facility: MEDICAL CENTER | Age: 62
End: 2022-03-09
Payer: COMMERCIAL

## 2022-03-09 ENCOUNTER — APPOINTMENT (OUTPATIENT)
Dept: LAB | Facility: MEDICAL CENTER | Age: 62
End: 2022-03-09
Payer: COMMERCIAL

## 2022-03-09 DIAGNOSIS — R53.83 FATIGUE, UNSPECIFIED TYPE: ICD-10-CM

## 2022-03-09 DIAGNOSIS — M79.671 RIGHT FOOT PAIN: ICD-10-CM

## 2022-03-09 DIAGNOSIS — R53.82 CHRONIC FATIGUE: ICD-10-CM

## 2022-03-09 PROBLEM — L65.9 HAIR LOSS: Status: ACTIVE | Noted: 2022-03-09

## 2022-03-09 LAB
ALBUMIN SERPL BCP-MCNC: 4 G/DL (ref 3.5–5)
ALP SERPL-CCNC: 97 U/L (ref 46–116)
ALT SERPL W P-5'-P-CCNC: 30 U/L (ref 12–78)
ANION GAP SERPL CALCULATED.3IONS-SCNC: 3 MMOL/L (ref 4–13)
AST SERPL W P-5'-P-CCNC: 18 U/L (ref 5–45)
BACTERIA UR QL AUTO: ABNORMAL /HPF
BASOPHILS # BLD AUTO: 0.05 THOUSANDS/ΜL (ref 0–0.1)
BASOPHILS NFR BLD AUTO: 1 % (ref 0–1)
BILIRUB SERPL-MCNC: 0.45 MG/DL (ref 0.2–1)
BILIRUB UR QL STRIP: NEGATIVE
BUN SERPL-MCNC: 12 MG/DL (ref 5–25)
CALCIUM SERPL-MCNC: 9.8 MG/DL (ref 8.3–10.1)
CHLORIDE SERPL-SCNC: 108 MMOL/L (ref 100–108)
CHOLEST SERPL-MCNC: 265 MG/DL
CLARITY UR: CLEAR
CO2 SERPL-SCNC: 28 MMOL/L (ref 21–32)
COLOR UR: ABNORMAL
CREAT SERPL-MCNC: 0.91 MG/DL (ref 0.6–1.3)
CRP SERPL QL: <3 MG/L
EOSINOPHIL # BLD AUTO: 0.11 THOUSAND/ΜL (ref 0–0.61)
EOSINOPHIL NFR BLD AUTO: 2 % (ref 0–6)
ERYTHROCYTE [DISTWIDTH] IN BLOOD BY AUTOMATED COUNT: 15 % (ref 11.6–15.1)
EST. AVERAGE GLUCOSE BLD GHB EST-MCNC: 114 MG/DL
GFR SERPL CREATININE-BSD FRML MDRD: 69 ML/MIN/1.73SQ M
GLUCOSE P FAST SERPL-MCNC: 98 MG/DL (ref 65–99)
GLUCOSE UR STRIP-MCNC: NEGATIVE MG/DL
HBA1C MFR BLD: 5.6 %
HCT VFR BLD AUTO: 43.1 % (ref 34.8–46.1)
HDLC SERPL-MCNC: 117 MG/DL
HGB BLD-MCNC: 14.3 G/DL (ref 11.5–15.4)
HGB UR QL STRIP.AUTO: NEGATIVE
IMM GRANULOCYTES # BLD AUTO: 0.02 THOUSAND/UL (ref 0–0.2)
IMM GRANULOCYTES NFR BLD AUTO: 0 % (ref 0–2)
KETONES UR STRIP-MCNC: NEGATIVE MG/DL
LDLC SERPL CALC-MCNC: 136 MG/DL (ref 0–100)
LEUKOCYTE ESTERASE UR QL STRIP: ABNORMAL
LYMPHOCYTES # BLD AUTO: 2.15 THOUSANDS/ΜL (ref 0.6–4.47)
LYMPHOCYTES NFR BLD AUTO: 39 % (ref 14–44)
MCH RBC QN AUTO: 28.5 PG (ref 26.8–34.3)
MCHC RBC AUTO-ENTMCNC: 33.2 G/DL (ref 31.4–37.4)
MCV RBC AUTO: 86 FL (ref 82–98)
MONOCYTES # BLD AUTO: 0.42 THOUSAND/ΜL (ref 0.17–1.22)
MONOCYTES NFR BLD AUTO: 8 % (ref 4–12)
MUCOUS THREADS UR QL AUTO: ABNORMAL
NEUTROPHILS # BLD AUTO: 2.84 THOUSANDS/ΜL (ref 1.85–7.62)
NEUTS SEG NFR BLD AUTO: 50 % (ref 43–75)
NITRITE UR QL STRIP: NEGATIVE
NON-SQ EPI CELLS URNS QL MICRO: ABNORMAL /HPF
NRBC BLD AUTO-RTO: 0 /100 WBCS
PH UR STRIP.AUTO: 5.5 [PH]
PHOSPHATE SERPL-MCNC: 3.6 MG/DL (ref 2.7–4.5)
PLATELET # BLD AUTO: 267 THOUSANDS/UL (ref 149–390)
PMV BLD AUTO: 10.3 FL (ref 8.9–12.7)
POTASSIUM SERPL-SCNC: 4.3 MMOL/L (ref 3.5–5.3)
PROT SERPL-MCNC: 8.3 G/DL (ref 6.4–8.2)
PROT UR STRIP-MCNC: NEGATIVE MG/DL
RBC # BLD AUTO: 5.01 MILLION/UL (ref 3.81–5.12)
RBC #/AREA URNS AUTO: ABNORMAL /HPF
SODIUM SERPL-SCNC: 139 MMOL/L (ref 136–145)
SP GR UR STRIP.AUTO: 1.02 (ref 1–1.03)
TRIGL SERPL-MCNC: 59 MG/DL
TSH SERPL DL<=0.05 MIU/L-ACNC: 1.79 UIU/ML (ref 0.36–3.74)
UROBILINOGEN UR STRIP-ACNC: <2 MG/DL
VIT B12 SERPL-MCNC: 398 PG/ML (ref 100–900)
WBC # BLD AUTO: 5.59 THOUSAND/UL (ref 4.31–10.16)
WBC #/AREA URNS AUTO: ABNORMAL /HPF

## 2022-03-09 PROCEDURE — 82607 VITAMIN B-12: CPT

## 2022-03-09 PROCEDURE — 81001 URINALYSIS AUTO W/SCOPE: CPT

## 2022-03-09 PROCEDURE — 85025 COMPLETE CBC W/AUTO DIFF WBC: CPT | Performed by: FAMILY MEDICINE

## 2022-03-09 PROCEDURE — 80061 LIPID PANEL: CPT | Performed by: FAMILY MEDICINE

## 2022-03-09 PROCEDURE — 86140 C-REACTIVE PROTEIN: CPT

## 2022-03-09 PROCEDURE — 73630 X-RAY EXAM OF FOOT: CPT

## 2022-03-09 PROCEDURE — 84443 ASSAY THYROID STIM HORMONE: CPT

## 2022-03-09 PROCEDURE — 84100 ASSAY OF PHOSPHORUS: CPT

## 2022-03-09 PROCEDURE — 80053 COMPREHEN METABOLIC PANEL: CPT | Performed by: FAMILY MEDICINE

## 2022-03-09 PROCEDURE — 36415 COLL VENOUS BLD VENIPUNCTURE: CPT | Performed by: FAMILY MEDICINE

## 2022-03-09 PROCEDURE — 86618 LYME DISEASE ANTIBODY: CPT

## 2022-03-09 PROCEDURE — 83036 HEMOGLOBIN GLYCOSYLATED A1C: CPT | Performed by: FAMILY MEDICINE

## 2022-03-09 NOTE — ASSESSMENT & PLAN NOTE
- No signs of alopecia on physical examination  - CBC and TSH ordered   - Patient may start trial of vitamin D and biotin supplements

## 2022-03-09 NOTE — PROGRESS NOTES
Assessment/Plan:    Chronic fatigue  Etiology unclear at this time  CBC, CMP, Lyme and Vitamin B12 ordered previously  Will add TSH, CRP, Phosphorous and UA to rule out underlying thyroid disease, electrolyte imbalance, inflammatory or kidney disorder  Right foot pain  -XR of the right foot ordered for further evaluation     Hair loss  - No signs of alopecia on physical examination  - CBC and TSH ordered   - Patient may start trial of vitamin D and biotin supplements          Diagnoses and all orders for this visit:    Chronic fatigue  -     UA (URINE) with reflex to Scope; Future  -     TSH, 3rd generation with Free T4 reflex; Future  -     Phosphorus; Future  -     C-reactive protein; Future    Right foot pain  -     XR foot 3+ vw right; Future    Hair loss          Subjective:      Patient ID: Quentin Travis is a 63 y o  female  HPI  Quentin Travis is a pleasant 63year old female who presents today with a chief complaint of hair loss, fatigue and right foot pain  Patient notes that her hair has been thinning for the past few months and that the fatigue has been ongoing  She did have blood work ordered previously to evaluate this further but did not get this done  She notes that sometimes there is swelling of her right foot and she tries to wear in soles for arch support  The following portions of the patient's history were reviewed and updated as appropriate: allergies, current medications, past family history, past medical history, past social history, past surgical history and problem list     Review of Systems   Constitutional: Positive for fatigue  HENT: Negative  Eyes: Negative  Respiratory: Negative  Cardiovascular: Negative  Gastrointestinal: Negative  Genitourinary: Negative  Musculoskeletal:        Right foot pain   Skin: Negative  Hair loss   Neurological: Negative  Psychiatric/Behavioral: Negative            Objective:      Pulse 80   Temp 97 8 °F (36 6 °C) (Skin)   Ht 5' 6" (1 676 m)   Wt 108 kg (237 lb)   LMP  (LMP Unknown)   SpO2 92%   BMI 38 25 kg/m²          Physical Exam  Constitutional:       General: She is not in acute distress  Appearance: She is not ill-appearing  HENT:      Head: Normocephalic and atraumatic  Eyes:      General:         Right eye: No discharge  Left eye: No discharge  Extraocular Movements: Extraocular movements intact  Cardiovascular:      Rate and Rhythm: Normal rate  Pulmonary:      Effort: Pulmonary effort is normal  No respiratory distress  Musculoskeletal:      Right lower leg: No edema  Left lower leg: No edema  Right foot: No swelling or tenderness  Normal pulse  Left foot: No swelling or tenderness  Normal pulse  Neurological:      General: No focal deficit present  Mental Status: She is alert     Psychiatric:         Mood and Affect: Mood normal          Behavior: Behavior normal

## 2022-03-09 NOTE — ASSESSMENT & PLAN NOTE
Etiology unclear at this time  CBC, CMP, Lyme and Vitamin B12 ordered previously  Will add TSH, CRP, Phosphorous and UA to rule out underlying thyroid disease, electrolyte imbalance, inflammatory or kidney disorder

## 2022-03-10 LAB — B BURGDOR IGG+IGM SER-ACNC: 36

## 2022-03-14 DIAGNOSIS — J39.2 THROAT IRRITATION: Primary | ICD-10-CM

## 2022-05-10 ENCOUNTER — CONSULT (OUTPATIENT)
Dept: GASTROENTEROLOGY | Facility: MEDICAL CENTER | Age: 62
End: 2022-05-10
Payer: COMMERCIAL

## 2022-05-10 VITALS
HEART RATE: 89 BPM | BODY MASS INDEX: 37.64 KG/M2 | TEMPERATURE: 98.7 F | WEIGHT: 233.2 LBS | DIASTOLIC BLOOD PRESSURE: 78 MMHG | SYSTOLIC BLOOD PRESSURE: 132 MMHG

## 2022-05-10 DIAGNOSIS — J39.2 THROAT IRRITATION: ICD-10-CM

## 2022-05-10 DIAGNOSIS — R05.3 CHRONIC COUGH: ICD-10-CM

## 2022-05-10 DIAGNOSIS — Z12.11 COLON CANCER SCREENING: ICD-10-CM

## 2022-05-10 DIAGNOSIS — R13.10 DYSPHAGIA, UNSPECIFIED TYPE: Primary | ICD-10-CM

## 2022-05-10 PROCEDURE — 1036F TOBACCO NON-USER: CPT | Performed by: STUDENT IN AN ORGANIZED HEALTH CARE EDUCATION/TRAINING PROGRAM

## 2022-05-10 PROCEDURE — 99203 OFFICE O/P NEW LOW 30 MIN: CPT | Performed by: STUDENT IN AN ORGANIZED HEALTH CARE EDUCATION/TRAINING PROGRAM

## 2022-05-10 RX ORDER — OMEPRAZOLE 20 MG/1
20 CAPSULE, DELAYED RELEASE ORAL DAILY
Qty: 30 CAPSULE | Refills: 2 | Status: SHIPPED | OUTPATIENT
Start: 2022-05-10 | End: 2022-07-06 | Stop reason: SDUPTHER

## 2022-05-10 NOTE — PROGRESS NOTES
[facility] - Outpatient Consultation  Kiah Iverson 63 y o  female MRN: [id number]  Encounter: [id number]          ASSESSMENT AND PLAN:    61F with chronic fatigue referred for chronic throat irritation, cough, and dysphagia  Has tried antihistamines in the past with no improvement making UACS/PND less likely  Could represent atypical GERD so will trial acid suppression and check EGD at time of colonoscopy  Also reports dysphagia which sounds suspicious for oropharyngeal dysphagia  1  Throat irritation  2  Chronic cough  3  Dysphagia, unspecified type  - Ambulatory Referral to Gastroenterology  - omeprazole (PriLOSEC) 20 mg delayed release capsule; Take 1 capsule (20 mg total) by mouth daily  Dispense: 30 capsule; Refill: 2  - FL barium swallow video w speech; Future  - EGD; Future  If above unrevealing, may benefit from ENT evaluation    4  Colon cancer screening  Normal colonoscopy 2013 with 5 year follow up recommended  No red flag symptoms  - Colonoscopy; Future      ______________________________________________________________________    HPI:   1 year ago, started having congestion and throat clearing  Tried allergy medicine with no improvement  Feels like there is vice at the top of her throat  Feels like voice is weak and has difficulty speaking loudly later in the day (works as sub teacher)    No heartburn or acid reflux  Doesn't have overt dysphagia but sometimes feels like its hard to swallow, especially if she doesn't chew well or takes big bite  Feels like large pills get stuck in throat  No prior EGD  Had colonoscopy in 2013, normal, repeat 5 years  Father had multiple polyps  REVIEW OF SYSTEMS:    CONSTITUTIONAL: Denies any fever, chills, rigors, and weight loss  HEENT: No earache or tinnitus  Denies hearing loss or visual disturbances  CARDIOVASCULAR: No chest pain or palpitations     RESPIRATORY: Denies any cough, hemoptysis, shortness of breath or dyspnea on exertion  GASTROINTESTINAL: As noted in the History of Present Illness  GENITOURINARY: No problems with urination  Denies any hematuria or dysuria  NEUROLOGIC: No dizziness or vertigo, denies headaches  MUSCULOSKELETAL: Denies any muscle or joint pain  SKIN: Denies skin rashes or itching  ENDOCRINE: Denies excessive thirst  Denies intolerance to heat or cold  PSYCHOSOCIAL: Denies depression or anxiety  Denies any recent memory loss  Historical Information   No past medical history on file  Past Surgical History:   Procedure Laterality Date    CORRECTION HAMMER TOE Right     arthroplasty, last assessed 10/27/2015    KNEE ARTHROPLASTY Right      last assessed 10/27/2015, with medial meniscus repair     Social History   Social History     Substance and Sexual Activity   Alcohol Use Yes    Comment: social     Social History     Substance and Sexual Activity   Drug Use No     Social History     Tobacco Use   Smoking Status Never Smoker   Smokeless Tobacco Never Used     Family History   Problem Relation Age of Onset    Arthritis Mother     Hypertension Father        Meds/Allergies       Current Outpatient Medications:     neomycin-polymyxin-dexamethasone (MAXITROL) 0 35%-10,000 units/g-0 1%    No Known Allergies        Objective     There were no vitals taken for this visit  There is no height or weight on file to calculate BMI  PHYSICAL EXAM:      General Appearance:   Alert, cooperative, no distress   HEENT:   Normocephalic, atraumatic, anicteric  Neck:  Supple, symmetrical, trachea midline   Lungs:   Clear to auscultation bilaterally; no rales, rhonchi or wheezing; respirations unlabored    Heart[de-identified]   Regular rate and rhythm; no murmur, rub, or gallop     Abdomen:   Soft, non-tender, non-distended; normal bowel sounds; no masses, no organomegaly    Genitalia:   Deferred    Rectal:   Deferred    Extremities:  No cyanosis, clubbing or edema    Pulses:  2+ and symmetric    Skin: No jaundice, rashes, or lesions    Lymph nodes:  No palpable cervical lymphadenopathy        Lab Results:   No visits with results within 1 Day(s) from this visit  Latest known visit with results is:   Appointment on 03/09/2022   Component Date Value    Vitamin B-12 03/09/2022 398     Lyme total antibody 03/09/2022 36     Color, UA 03/09/2022 Light Yellow     Clarity, UA 03/09/2022 Clear     Specific Gravity, UA 03/09/2022 1 018     pH, UA 03/09/2022 5 5     Leukocytes, UA 03/09/2022 Moderate*    Nitrite, UA 03/09/2022 Negative     Protein, UA 03/09/2022 Negative     Glucose, UA 03/09/2022 Negative     Ketones, UA 03/09/2022 Negative     Urobilinogen, UA 03/09/2022 <2 0     Bilirubin, UA 03/09/2022 Negative     Blood, UA 03/09/2022 Negative     TSH 3RD GENERATON 03/09/2022 1 790     Phosphorus 03/09/2022 3 6     CRP 03/09/2022 <3 0     RBC, UA 03/09/2022 1-2     WBC, UA 03/09/2022 2-4*    Epithelial Cells 03/09/2022 Occasional     Bacteria, UA 03/09/2022 Occasional     MUCUS THREADS 03/09/2022 Occasional*         Radiology Results:   No results found

## 2022-05-10 NOTE — PATIENT INSTRUCTIONS
Scheduled date of Colon/EGD (as of today) 08/15/22  Physician performing : Her  Location of procedure :  [facility]  Bowel prep reviewed with patient: Miralax/Mag  Instructions reviewed with patient by: MA  Clearances: NA

## 2022-06-30 ENCOUNTER — HOSPITAL ENCOUNTER (OUTPATIENT)
Dept: RADIOLOGY | Facility: HOSPITAL | Age: 62
Discharge: HOME | End: 2022-06-30
Attending: STUDENT IN AN ORGANIZED HEALTH CARE EDUCATION/TRAINING PROGRAM
Payer: COMMERCIAL

## 2022-06-30 DIAGNOSIS — R13.10 DYSPHAGIA, UNSPECIFIED TYPE: ICD-10-CM

## 2022-06-30 DIAGNOSIS — J39.2 THROAT IRRITATION: ICD-10-CM

## 2022-06-30 PROCEDURE — 74230 X-RAY XM SWLNG FUNCJ C+: CPT

## 2022-06-30 PROCEDURE — 92611 MOTION FLUOROSCOPY/SWALLOW: CPT

## 2022-06-30 NOTE — PROCEDURES
Video Barium Swallow Study    Summary:  Images are on PACS for review  The patient presents with adequate oral and pharyngeal stages of swallowing  Mastication is timely and efficient, with good oral control  Swallow initiation is timely  No pharyngeal residue is observed  The patient presents with a CP prominence near C3  Prominence does not impede bolus passage, or cause any retropulsion of material  No penetration or aspiration events observed  Per gross esophageal screen:  [facility]    Recommendations:  Diet: Regular  Liquids: Thin   Meds: Whole with liquids   Strategies: Alternate liquids and solids     Reflux Precautions  Consider consult with: ENT  Results reviewed with: pt    GI note from 5/10/2022:   ASSESSMENT AND PLAN:    61F with chronic fatigue referred for chronic throat irritation, cough, and dysphagia  Has tried antihistamines in the past with no improvement making UACS/PND less likely  Could represent atypical GERD so will trial acid suppression and check EGD at time of colonoscopy  Also reports dysphagia which sounds suspicious for oropharyngeal dysphagia  1  Throat irritation  2  Chronic cough  3  Dysphagia, unspecified type  - Ambulatory Referral to Gastroenterology  - omeprazole (PriLOSEC) 20 mg delayed release capsule; Take 1 capsule (20 mg total) by mouth daily  Dispense: 30 capsule; Refill: 2  - FL barium swallow video w speech; Future  - EGD; Future  If above unrevealing, may benefit from ENT evaluation   4  Colon cancer screening  Normal colonoscopy 2013 with 5 year follow up recommended  No red flag symptoms  - Colonoscopy; Future  H&P/pertinent provider notes: (PMH noted above)    Special Studies:  None  Previous VBS:  None    Does the pt have pain? No  If yes, was nursing made aware/was it addressed?     Precautions:  Reflux  Food Allergies:  None   Current Diet:  Regular with thin liquids    Dentition:  Adequate  Oral mech:  Strength and ROM: wfl  Vocal Quality/Speech:  Wfl, but patient reports change in vocal quality     Consistencies administered: Puree, hard solid, mixed consistency, thin liquid, barium tablet in with thin  Liquids were administered/taken by straw     Pt was seated laterally at 90 degrees  Oral stage:  WNL  Lip closure: wfl  Mastication: wfl  Bolus formation: wfl  Bolus control: wfl  Transfer: wfl  Residue: no    Pharyngeal stage: WNL  Swallow promptness: wfl  Spill to valleculae: no  Spill to pyriforms: no  Epiglottic inversion: wfl  Laryngeal excursion: wfl  Pharyngeal constriction: wfl  Vallecular retention: no  Pyriform retention: no  PPW coating: no  Osteophytes: no  CP prominence: yes  Retropulsion from prominence: no  Transient penetration: no  Epiglottic undercoat: no  Penetration: no  Aspiration: no  Strategies: n/a  Response to aspiration: n/a    Screening of Esophageal stage:   WNL

## 2022-07-06 ENCOUNTER — OFFICE VISIT (OUTPATIENT)
Dept: FAMILY MEDICINE CLINIC | Facility: CLINIC | Age: 62
End: 2022-07-06
Payer: COMMERCIAL

## 2022-07-06 VITALS
HEART RATE: 96 BPM | DIASTOLIC BLOOD PRESSURE: 76 MMHG | OXYGEN SATURATION: 99 % | TEMPERATURE: 97.1 F | WEIGHT: 233 LBS | BODY MASS INDEX: 37.45 KG/M2 | SYSTOLIC BLOOD PRESSURE: 124 MMHG | HEIGHT: 66 IN

## 2022-07-06 DIAGNOSIS — R09.89 GLOBUS SENSATION: ICD-10-CM

## 2022-07-06 DIAGNOSIS — Z00.00 WELL ADULT EXAM: Primary | ICD-10-CM

## 2022-07-06 DIAGNOSIS — Z12.4 SCREENING FOR CERVICAL CANCER: ICD-10-CM

## 2022-07-06 DIAGNOSIS — Z11.4 SCREENING FOR HIV (HUMAN IMMUNODEFICIENCY VIRUS): ICD-10-CM

## 2022-07-06 DIAGNOSIS — Z12.31 ENCOUNTER FOR SCREENING MAMMOGRAM FOR MALIGNANT NEOPLASM OF BREAST: ICD-10-CM

## 2022-07-06 DIAGNOSIS — Z11.59 NEED FOR HEPATITIS C SCREENING TEST: ICD-10-CM

## 2022-07-06 DIAGNOSIS — J39.2 THROAT IRRITATION: ICD-10-CM

## 2022-07-06 PROBLEM — M79.671 RIGHT FOOT PAIN: Status: RESOLVED | Noted: 2022-03-09 | Resolved: 2022-07-06

## 2022-07-06 PROBLEM — R53.82 CHRONIC FATIGUE: Status: RESOLVED | Noted: 2022-03-09 | Resolved: 2022-07-06

## 2022-07-06 PROCEDURE — 99396 PREV VISIT EST AGE 40-64: CPT | Performed by: FAMILY MEDICINE

## 2022-07-06 PROCEDURE — 3725F SCREEN DEPRESSION PERFORMED: CPT | Performed by: FAMILY MEDICINE

## 2022-07-06 RX ORDER — OMEPRAZOLE 20 MG/1
20 CAPSULE, DELAYED RELEASE ORAL DAILY
Qty: 60 CAPSULE | Refills: 2 | Status: SHIPPED | OUTPATIENT
Start: 2022-07-06 | End: 2022-08-15 | Stop reason: HOSPADM

## 2022-07-06 NOTE — PROGRESS NOTES
Assessment/Plan:  No significant findings on physical exam   Recommend starting omeprazole  Consider follow-up with ENT specialist   Start Zyrtec and Flonase at the same time as well  She will call with any new persisting or worsening symptoms  Card given for Dr Valeria Graf  Patient agrees to schedule  Time spent counseling reviewing treatment plan and coordinating care and medication options as well as documentation was 30 minutes  1  Well adult exam    2  Globus sensation    3  Throat irritation  -     omeprazole (PriLOSEC) 20 mg delayed release capsule; Take 1 capsule (20 mg total) by mouth daily    4  Need for hepatitis C screening test    5  Screening for HIV (human immunodeficiency virus)    6  Encounter for screening mammogram for malignant neoplasm of breast    7  Screening for cervical cancer          Subjective:      Patient ID: Zaid Zavala is a 63 y o  female  Patient has a feeling that she needs to clear her throat constantly  She has tried allergy medication at times and acid reflux medication but without much relief  No sore throat  No weight changes or appetite changes  The following portions of the patient's history were reviewed and updated as appropriate: allergies, current medications, past family history, past medical history, past social history, past surgical history, and problem list     Review of Systems   Constitutional: Negative  HENT:        As noted in HPI   Eyes: Negative  Respiratory: Negative  Cardiovascular: Negative  Gastrointestinal: Negative  Endocrine: Negative  Genitourinary: Negative  Musculoskeletal: Negative  Skin: Negative  Allergic/Immunologic: Negative  Neurological: Negative  Hematological: Negative  Psychiatric/Behavioral: Negative            Objective:      /76 (BP Location: Left arm, Patient Position: Sitting, Cuff Size: Standard)   Pulse 96   Temp (!) 97 1 °F (36 2 °C) (Temporal)   Ht 5' 6" (1 676 m)   Wt 106 kg (233 lb)   LMP  (LMP Unknown)   SpO2 99%   BMI 37 61 kg/m²          Physical Exam  Vitals reviewed  Constitutional:       Appearance: She is well-developed  HENT:      Head: Normocephalic and atraumatic  Right Ear: External ear normal  Tympanic membrane is not erythematous or bulging  Left Ear: External ear normal  Tympanic membrane is not erythematous or bulging  Nose: Nose normal       Mouth/Throat:      Mouth: No oral lesions  Pharynx: No oropharyngeal exudate  Eyes:      General: No scleral icterus  Right eye: No discharge  Left eye: No discharge  Conjunctiva/sclera: Conjunctivae normal    Neck:      Thyroid: No thyromegaly  Cardiovascular:      Rate and Rhythm: Normal rate and regular rhythm  Heart sounds: Normal heart sounds  No murmur heard  No friction rub  No gallop  Pulmonary:      Effort: Pulmonary effort is normal  No respiratory distress  Breath sounds: No wheezing or rales  Chest:      Chest wall: No tenderness  Abdominal:      General: Bowel sounds are normal  There is no distension  Palpations: Abdomen is soft  There is no mass  Tenderness: There is no abdominal tenderness  There is no guarding or rebound  Musculoskeletal:         General: No tenderness or deformity  Normal range of motion  Cervical back: Normal range of motion and neck supple  Lymphadenopathy:      Cervical: No cervical adenopathy  Skin:     General: Skin is warm and dry  Coloration: Skin is not pale  Findings: No erythema or rash  Neurological:      Mental Status: She is alert and oriented to person, place, and time  Cranial Nerves: No cranial nerve deficit  Motor: No abnormal muscle tone  Coordination: Coordination normal       Deep Tendon Reflexes: Reflexes are normal and symmetric     Psychiatric:         Behavior: Behavior normal

## 2022-07-06 NOTE — PROGRESS NOTES
BMI Counseling: Body mass index is 37 61 kg/m²  The BMI is above normal  Nutrition recommendations include reducing portion sizes

## 2022-08-10 ENCOUNTER — APPOINTMENT (OUTPATIENT)
Dept: RADIOLOGY | Facility: CLINIC | Age: 62
End: 2022-08-10
Payer: COMMERCIAL

## 2022-08-10 DIAGNOSIS — M79.671 PAIN IN RIGHT FOOT: Primary | ICD-10-CM

## 2022-08-10 DIAGNOSIS — M79.671 PAIN IN RIGHT FOOT: ICD-10-CM

## 2022-08-10 PROCEDURE — 73630 X-RAY EXAM OF FOOT: CPT

## 2022-08-12 RX ORDER — SODIUM CHLORIDE 9 MG/ML
125 INJECTION, SOLUTION INTRAVENOUS CONTINUOUS
Status: CANCELLED | OUTPATIENT
Start: 2022-08-12

## 2022-08-12 RX ORDER — ALBUTEROL SULFATE 2.5 MG/3ML
2.5 SOLUTION RESPIRATORY (INHALATION) ONCE AS NEEDED
Status: CANCELLED | OUTPATIENT
Start: 2022-08-12

## 2022-08-12 RX ORDER — PROMETHAZINE HYDROCHLORIDE 25 MG/ML
12.5 INJECTION, SOLUTION INTRAMUSCULAR; INTRAVENOUS ONCE AS NEEDED
Status: CANCELLED | OUTPATIENT
Start: 2022-08-12

## 2022-08-12 RX ORDER — ONDANSETRON 2 MG/ML
4 INJECTION INTRAMUSCULAR; INTRAVENOUS ONCE AS NEEDED
Status: CANCELLED | OUTPATIENT
Start: 2022-08-12

## 2022-08-13 ENCOUNTER — NURSE TRIAGE (OUTPATIENT)
Dept: OTHER | Facility: OTHER | Age: 62
End: 2022-08-13

## 2022-08-13 NOTE — TELEPHONE ENCOUNTER
Reason for Disposition   Bowel prep for colonoscopy, questions about    Answer Assessment - Initial Assessment Questions  1  DATE/TIME: "When did you have your colonoscopy?"       8/18    2   MAIN CONCERN: "What is your main concern right now?" "What questions do you have?"      Colonoscopy prep    Protocols used: COLONOSCOPY SYMPTOMS AND QUESTIONS-ADULT-[facility]

## 2022-08-13 NOTE — TELEPHONE ENCOUNTER
Pt unable to purchase mag citrate d/t recall  Alternative instructions provided and sent via [employer]

## 2022-08-13 NOTE — TELEPHONE ENCOUNTER
Regarding: Colonoscopy Prep  ----- Message from Mari Urrutia sent at 8/13/2022 11:11 AM EDT -----  "I have a colonoscopy on Monday and I can't find magnesium citrate because of the recall   What prep should I use?"

## 2022-08-15 ENCOUNTER — ANESTHESIA EVENT (OUTPATIENT)
Dept: GASTROENTEROLOGY | Facility: MEDICAL CENTER | Age: 62
End: 2022-08-15

## 2022-08-15 ENCOUNTER — HOSPITAL ENCOUNTER (OUTPATIENT)
Dept: GASTROENTEROLOGY | Facility: MEDICAL CENTER | Age: 62
Setting detail: OUTPATIENT SURGERY
Discharge: HOME | End: 2022-08-15
Admitting: STUDENT IN AN ORGANIZED HEALTH CARE EDUCATION/TRAINING PROGRAM
Payer: COMMERCIAL

## 2022-08-15 ENCOUNTER — ANESTHESIA (OUTPATIENT)
Dept: GASTROENTEROLOGY | Facility: MEDICAL CENTER | Age: 62
End: 2022-08-15

## 2022-08-15 VITALS
SYSTOLIC BLOOD PRESSURE: 108 MMHG | BODY MASS INDEX: 36.32 KG/M2 | TEMPERATURE: 96.8 F | HEIGHT: 66 IN | WEIGHT: 226 LBS | DIASTOLIC BLOOD PRESSURE: 59 MMHG | RESPIRATION RATE: 18 BRPM | OXYGEN SATURATION: 96 % | HEART RATE: 64 BPM

## 2022-08-15 DIAGNOSIS — R13.10 DYSPHAGIA, UNSPECIFIED TYPE: ICD-10-CM

## 2022-08-15 DIAGNOSIS — Z12.11 COLON CANCER SCREENING: ICD-10-CM

## 2022-08-15 DIAGNOSIS — J39.2 THROAT IRRITATION: ICD-10-CM

## 2022-08-15 DIAGNOSIS — K20.90 ESOPHAGITIS: Primary | ICD-10-CM

## 2022-08-15 PROCEDURE — 88305 TISSUE EXAM BY PATHOLOGIST: CPT | Performed by: PATHOLOGY

## 2022-08-15 PROCEDURE — 88342 IMHCHEM/IMCYTCHM 1ST ANTB: CPT | Performed by: PATHOLOGY

## 2022-08-15 PROCEDURE — 45380 COLONOSCOPY AND BIOPSY: CPT | Performed by: STUDENT IN AN ORGANIZED HEALTH CARE EDUCATION/TRAINING PROGRAM

## 2022-08-15 PROCEDURE — 43239 EGD BIOPSY SINGLE/MULTIPLE: CPT | Performed by: STUDENT IN AN ORGANIZED HEALTH CARE EDUCATION/TRAINING PROGRAM

## 2022-08-15 RX ORDER — LIDOCAINE HYDROCHLORIDE 20 MG/ML
INJECTION, SOLUTION EPIDURAL; INFILTRATION; INTRACAUDAL; PERINEURAL AS NEEDED
Status: DISCONTINUED | OUTPATIENT
Start: 2022-08-15 | End: 2022-08-15

## 2022-08-15 RX ORDER — PROPOFOL 10 MG/ML
INJECTION, EMULSION INTRAVENOUS AS NEEDED
Status: DISCONTINUED | OUTPATIENT
Start: 2022-08-15 | End: 2022-08-15

## 2022-08-15 RX ORDER — SODIUM CHLORIDE 9 MG/ML
125 INJECTION, SOLUTION INTRAVENOUS CONTINUOUS
Status: DISCONTINUED | OUTPATIENT
Start: 2022-08-15 | End: 2022-08-19 | Stop reason: HOSPADM

## 2022-08-15 RX ORDER — OMEPRAZOLE 20 MG/1
20 CAPSULE, DELAYED RELEASE ORAL DAILY
Qty: 30 CAPSULE | Refills: 5 | Status: SHIPPED | OUTPATIENT
Start: 2022-08-15 | End: 2022-10-19 | Stop reason: ALTCHOICE

## 2022-08-15 RX ORDER — PROMETHAZINE HYDROCHLORIDE 25 MG/ML
12.5 INJECTION, SOLUTION INTRAMUSCULAR; INTRAVENOUS ONCE AS NEEDED
Status: DISCONTINUED | OUTPATIENT
Start: 2022-08-15 | End: 2022-08-19 | Stop reason: HOSPADM

## 2022-08-15 RX ORDER — ALBUTEROL SULFATE 2.5 MG/3ML
2.5 SOLUTION RESPIRATORY (INHALATION) ONCE AS NEEDED
Status: DISCONTINUED | OUTPATIENT
Start: 2022-08-15 | End: 2022-08-19 | Stop reason: HOSPADM

## 2022-08-15 RX ORDER — ONDANSETRON 2 MG/ML
4 INJECTION INTRAMUSCULAR; INTRAVENOUS ONCE AS NEEDED
Status: DISCONTINUED | OUTPATIENT
Start: 2022-08-15 | End: 2022-08-19 | Stop reason: HOSPADM

## 2022-08-15 RX ADMIN — PROPOFOL 30 MG: 10 INJECTION, EMULSION INTRAVENOUS at 10:26

## 2022-08-15 RX ADMIN — PROPOFOL 40 MG: 10 INJECTION, EMULSION INTRAVENOUS at 10:16

## 2022-08-15 RX ADMIN — PROPOFOL 40 MG: 10 INJECTION, EMULSION INTRAVENOUS at 10:20

## 2022-08-15 RX ADMIN — PROPOFOL 20 MG: 10 INJECTION, EMULSION INTRAVENOUS at 10:39

## 2022-08-15 RX ADMIN — SODIUM CHLORIDE 125 ML/HR: 0.9 INJECTION, SOLUTION INTRAVENOUS at 09:48

## 2022-08-15 RX ADMIN — PROPOFOL 40 MG: 10 INJECTION, EMULSION INTRAVENOUS at 10:14

## 2022-08-15 RX ADMIN — PROPOFOL 30 MG: 10 INJECTION, EMULSION INTRAVENOUS at 10:36

## 2022-08-15 RX ADMIN — PROPOFOL 30 MG: 10 INJECTION, EMULSION INTRAVENOUS at 10:23

## 2022-08-15 RX ADMIN — PROPOFOL 30 MG: 10 INJECTION, EMULSION INTRAVENOUS at 10:30

## 2022-08-15 RX ADMIN — PROPOFOL 40 MG: 10 INJECTION, EMULSION INTRAVENOUS at 10:12

## 2022-08-15 RX ADMIN — PROPOFOL 30 MG: 10 INJECTION, EMULSION INTRAVENOUS at 10:47

## 2022-08-15 RX ADMIN — PROPOFOL 30 MG: 10 INJECTION, EMULSION INTRAVENOUS at 10:32

## 2022-08-15 RX ADMIN — PROPOFOL 30 MG: 10 INJECTION, EMULSION INTRAVENOUS at 10:34

## 2022-08-15 RX ADMIN — PROPOFOL 150 MG: 10 INJECTION, EMULSION INTRAVENOUS at 10:10

## 2022-08-15 RX ADMIN — PROPOFOL 30 MG: 10 INJECTION, EMULSION INTRAVENOUS at 10:43

## 2022-08-15 RX ADMIN — PROPOFOL 40 MG: 10 INJECTION, EMULSION INTRAVENOUS at 10:22

## 2022-08-15 RX ADMIN — PROPOFOL 40 MG: 10 INJECTION, EMULSION INTRAVENOUS at 10:18

## 2022-08-15 RX ADMIN — PROPOFOL 30 MG: 10 INJECTION, EMULSION INTRAVENOUS at 10:40

## 2022-08-15 RX ADMIN — LIDOCAINE HYDROCHLORIDE 100 MG: 20 INJECTION, SOLUTION EPIDURAL; INFILTRATION; INTRACAUDAL; PERINEURAL at 10:10

## 2022-08-15 RX ADMIN — PROPOFOL 30 MG: 10 INJECTION, EMULSION INTRAVENOUS at 10:38

## 2022-08-15 RX ADMIN — PROPOFOL 30 MG: 10 INJECTION, EMULSION INTRAVENOUS at 10:41

## 2022-08-15 RX ADMIN — PROPOFOL 30 MG: 10 INJECTION, EMULSION INTRAVENOUS at 10:45

## 2022-08-15 RX ADMIN — PROPOFOL 30 MG: 10 INJECTION, EMULSION INTRAVENOUS at 10:28

## 2022-08-15 NOTE — ANESTHESIA POSTPROCEDURE EVALUATION
Post-Op Assessment Note    CV Status:  Stable    Pain management: adequate     Mental Status:  Alert and awake   Hydration Status:  Euvolemic   PONV Controlled:  Controlled   Airway Patency:  Patent      Post Op Vitals Reviewed: Yes      Staff: Anesthesiologist         No complications documented      BP      Temp     Pulse     Resp      SpO2      /59   Pulse 64   Temp (!) 96 8 °F (36 °C) (Temporal)   Resp 18   Ht 5' 6" (1 676 m)   Wt 103 kg (226 lb)   LMP  (LMP Unknown)   SpO2 96%   BMI 36 48 kg/m²

## 2022-08-15 NOTE — ANESTHESIA PREPROCEDURE EVALUATION
Procedure:  COLONOSCOPY  EGD    Relevant Problems   MUSCULOSKELETAL   (+) Idiopathic scoliosis        Physical Exam    Airway    Mallampati score: II  TM Distance: >3 FB  Neck ROM: full     Dental       Cardiovascular  Rhythm: regular, Rate: normal,     Pulmonary  Breath sounds clear to auscultation,     Other Findings        Anesthesia Plan  ASA Score- 2     Anesthesia Type- IV sedation with anesthesia with ASA Monitors  Additional Monitors:   Airway Plan:           Plan Factors-Exercise tolerance (METS): >4 METS  Chart reviewed  Existing labs reviewed  Induction- intravenous  Postoperative Plan-     Informed Consent- Anesthetic plan and risks discussed with patient

## 2022-08-15 NOTE — H&P
History and Physical - [facility]  Amy Taylor 63 y o  female MRN: [id number]                  HPI: Amy Taylor is a 63y o  year old female who presents for throat irritation, dysphagia, colon cancer screening  REVIEW OF SYSTEMS: Per the HPI, and otherwise unremarkable      Historical Information   Past Medical History:   Diagnosis Date    Environmental allergies      Past Surgical History:   Procedure Laterality Date    ARTHROSCOPIC REPAIR ACL Right      SECTION      CORRECTION HAMMER TOE Right     arthroplasty, last assessed 10/27/2015    KNEE ARTHROPLASTY Right      last assessed 10/27/2015, with medial meniscus repair    PARTIAL HYSTERECTOMY      with bladder suspension    SEPTOPLASTY      TONSILLECTOMY       Social History   Social History     Substance and Sexual Activity   Alcohol Use Yes    Comment: social     Social History     Substance and Sexual Activity   Drug Use No     Social History     Tobacco Use   Smoking Status Never Smoker   Smokeless Tobacco Never Used     Family History   Problem Relation Age of Onset    Arthritis Mother     Hypertension Father        Meds/Allergies       Current Outpatient Medications:     azelastine (ASTELIN) 0 1 % nasal spray    cetirizine (ZyrTEC) 10 mg tablet    Fluticasone Propionate (FLONASE ALLERGY RELIEF NA)    neomycin-polymyxin-dexamethasone (MAXITROL) 0 35%-10,000 units/g-0 1%    omeprazole (PriLOSEC) 20 mg delayed release capsule    Current Facility-Administered Medications:     sodium chloride 0 9 % infusion, 125 mL/hr, Intravenous, Continuous, 125 mL/hr at 08/15/22 0948    Allergies   Allergen Reactions    Molds [employer] Smuts Allergic Rhinitis       Objective     LMP  (LMP Unknown)       PHYSICAL EXAM    Gen: NAD  Head: NCAT  CV: RRR  CHEST: Clear  ABD: soft, NT/ND  EXT: no edema      ASSESSMENT/PLAN:  This is a 63y o  year old female here for EGD and colonoscopy, and she is stable and optimized for her procedure

## 2022-09-14 ENCOUNTER — OFFICE VISIT (OUTPATIENT)
Dept: GASTROENTEROLOGY | Facility: MEDICAL CENTER | Age: 62
End: 2022-09-14
Payer: COMMERCIAL

## 2022-09-14 VITALS
SYSTOLIC BLOOD PRESSURE: 117 MMHG | WEIGHT: 226 LBS | HEIGHT: 66 IN | TEMPERATURE: 97.3 F | DIASTOLIC BLOOD PRESSURE: 76 MMHG | BODY MASS INDEX: 36.32 KG/M2 | HEART RATE: 82 BPM

## 2022-09-14 DIAGNOSIS — K20.90 ESOPHAGITIS: Primary | ICD-10-CM

## 2022-09-14 DIAGNOSIS — K21.00 GASTROESOPHAGEAL REFLUX DISEASE WITH ESOPHAGITIS WITHOUT HEMORRHAGE: ICD-10-CM

## 2022-09-14 DIAGNOSIS — Z12.11 SCREEN FOR COLON CANCER: ICD-10-CM

## 2022-09-14 DIAGNOSIS — K31.A19 GASTRIC INTESTINAL METAPLASIA WITHOUT DYSPLASIA: ICD-10-CM

## 2022-09-14 PROCEDURE — 99214 OFFICE O/P EST MOD 30 MIN: CPT | Performed by: STUDENT IN AN ORGANIZED HEALTH CARE EDUCATION/TRAINING PROGRAM

## 2022-09-14 NOTE — PROGRESS NOTES
[facility] - Outpatient Follow-up Note  Gerri Joshi 63 y o  female MRN: [id number]  Encounter: [id number]          ASSESSMENT AND PLAN:    61F with chronic fatigue here for follow up of chronic throat irritation, cough, and dysphagia  EGD with grade B esophagitis and gastric intestinal metaplasia  No improvement in symptoms with 2 week trial of PPI, suspect her symptoms may be more ENT mediated  1  Esophagitis  2  Gastroesophageal reflux disease with esophagitis without hemorrhage  -Discussed PPI use to heal inflammation however she prefers to avoid this at this time given she did not see any symptom improvement on PPI    3  Gastric intestinal metaplasia without dysplasia  EGD with gastric mapping summer 2023    4  Screen for colon cancer  No adenomas, repeat     There are no diagnoses linked to this encounter   ______________________________________________________________________    SUBJECTIVE:    Throat clearing is still the same  Has not been good about using medications regularly  Does not want to take omeprazole because of side effect profile  Did try it for 2 weeks and didn't think it helped  Last seen by me 5/10/22 for chronic cough/dysphagia/throat irritation and CRC screening  Started omeprazole 20 mg daily  VBS normal  Colonoscopy with no adenomas, moderate diverticulosis, repeat 10 years  EGD with grade B esophagitis, mild antral gastritis with intestinal metaplasia, fundic gland polyp  Uncle had either pancreatic or stomach cancer at age [de-identified]  REVIEW OF SYSTEMS IS OTHERWISE NEGATIVE        Historical Information   Past Medical History:   Diagnosis Date    Environmental allergies      Past Surgical History:   Procedure Laterality Date    ARTHROSCOPIC REPAIR ACL Right      SECTION      CORRECTION HAMMER TOE Right     arthroplasty, last assessed 10/27/2015    KNEE ARTHROPLASTY Right      last assessed 10/27/2015, with medial meniscus repair  PARTIAL HYSTERECTOMY      with bladder suspension    SEPTOPLASTY      TONSILLECTOMY       Social History   Social History     Substance and Sexual Activity   Alcohol Use Yes    Comment: social     Social History     Substance and Sexual Activity   Drug Use No     Social History     Tobacco Use   Smoking Status Never Smoker   Smokeless Tobacco Never Used     Family History   Problem Relation Age of Onset    Arthritis Mother     Hypertension Father        Meds/Allergies       Current Outpatient Medications:     azelastine (ASTELIN) 0 1 % nasal spray    cetirizine (ZyrTEC) 10 mg tablet    Fluticasone Propionate (FLONASE ALLERGY RELIEF NA)    neomycin-polymyxin-dexamethasone (MAXITROL) 0 35%-10,000 units/g-0 1%    omeprazole (PriLOSEC) 20 mg delayed release capsule    Allergies   Allergen Reactions    Molds & Smuts Allergic Rhinitis           Objective     Blood pressure 117/76, pulse 82, temperature (!) 97 3 °F (36 3 °C), temperature source Temporal, height 5' 6" (1 676 m), weight 103 kg (226 lb)  Body mass index is 36 48 kg/m²  PHYSICAL EXAM:      General Appearance:   Alert, cooperative, no distress   HEENT:   Normocephalic, atraumatic, anicteric  Neck:  Supple, symmetrical, trachea midline   Lungs:   Clear to auscultation bilaterally; no rales, rhonchi or wheezing; respirations unlabored    Heart[de-identified]   Regular rate and rhythm; no murmur, rub, or gallop  Abdomen:   Soft, non-tender, non-distended; normal bowel sounds; no masses, no organomegaly    Genitalia:   Deferred    Rectal:   Deferred    Extremities:  No cyanosis, clubbing or edema    Pulses:  2+ and symmetric    Skin:  No jaundice, rashes, or lesions    Lymph nodes:  No palpable cervical lymphadenopathy        Lab Results:   No visits with results within 1 Day(s) from this visit     Latest known visit with results is:   Hospital Outpatient Visit on 08/15/2022   Component Date Value    Case Report 08/15/2022 Value:Surgical Pathology Report                         Case: [id number]                                   Authorizing Provider:  Saurabh Woodall MD          Collected:           08/15/2022 1012              Ordering Location:     [facility]        Received:            08/15/2022 [facility] [address] [facility]                                                     Pathologist:           Emile Mckeon MD                                                           Specimens:   A) - Duodenum, D2 nodule bx                                                                         B) - Stomach, Stomach bx r/o h pylori                                                               C) - Polyp, Stomach/Small Intestine, Gastric polyp cold snare                                       D) - Esophagus, Esophagus bx r/o EOE                                                                E) - Polyp, Colorectal, transverse colon polyp cold forceps                                Final Diagnosis 08/15/2022                      Value: This result contains rich text formatting which cannot be displayed here   Additional Information 08/15/2022                      Value: This result contains rich text formatting which cannot be displayed here   Synoptic Checklist 08/15/2022                      Value:                            COLON/RECTUM POLYP FORM - GI - E                                                                                     :    Other      Gross Description 08/15/2022                      Value: This result contains rich text formatting which cannot be displayed here  Radiology Results:   No results found

## 2022-10-18 ENCOUNTER — RA CDI HCC (OUTPATIENT)
Dept: OTHER | Facility: HOSPITAL | Age: 62
End: 2022-10-18

## 2022-10-19 ENCOUNTER — TELEPHONE (OUTPATIENT)
Dept: FAMILY MEDICINE CLINIC | Facility: CLINIC | Age: 62
End: 2022-10-19

## 2022-10-19 ENCOUNTER — OFFICE VISIT (OUTPATIENT)
Dept: FAMILY MEDICINE CLINIC | Facility: CLINIC | Age: 62
End: 2022-10-19
Payer: COMMERCIAL

## 2022-10-19 VITALS
RESPIRATION RATE: 16 BRPM | HEART RATE: 74 BPM | OXYGEN SATURATION: 97 % | BODY MASS INDEX: 36.79 KG/M2 | DIASTOLIC BLOOD PRESSURE: 74 MMHG | HEIGHT: 65 IN | TEMPERATURE: 97.9 F | SYSTOLIC BLOOD PRESSURE: 132 MMHG | WEIGHT: 220.8 LBS

## 2022-10-19 DIAGNOSIS — E66.09 CLASS 2 OBESITY DUE TO EXCESS CALORIES WITHOUT SERIOUS COMORBIDITY WITH BODY MASS INDEX (BMI) OF 36.0 TO 36.9 IN ADULT: Primary | ICD-10-CM

## 2022-10-19 DIAGNOSIS — E87.6 HYPOKALEMIA: ICD-10-CM

## 2022-10-19 DIAGNOSIS — Z11.59 NEED FOR HEPATITIS C SCREENING TEST: ICD-10-CM

## 2022-10-19 DIAGNOSIS — R73.09 ELEVATED GLUCOSE: ICD-10-CM

## 2022-10-19 DIAGNOSIS — Z11.4 SCREENING FOR HIV (HUMAN IMMUNODEFICIENCY VIRUS): ICD-10-CM

## 2022-10-19 DIAGNOSIS — B35.1 ONYCHOMYCOSIS: ICD-10-CM

## 2022-10-19 DIAGNOSIS — Z12.4 SCREENING FOR CERVICAL CANCER: ICD-10-CM

## 2022-10-19 DIAGNOSIS — Z12.31 ENCOUNTER FOR SCREENING MAMMOGRAM FOR MALIGNANT NEOPLASM OF BREAST: ICD-10-CM

## 2022-10-19 LAB — SL AMB POCT HEMOGLOBIN AIC: 5.7 (ref ?–6.5)

## 2022-10-19 PROCEDURE — 83036 HEMOGLOBIN GLYCOSYLATED A1C: CPT | Performed by: FAMILY MEDICINE

## 2022-10-19 PROCEDURE — 99214 OFFICE O/P EST MOD 30 MIN: CPT | Performed by: FAMILY MEDICINE

## 2022-10-19 RX ORDER — TERBINAFINE HYDROCHLORIDE 250 MG/1
250 TABLET ORAL DAILY
Qty: 90 TABLET | Refills: 0 | Status: SHIPPED | OUTPATIENT
Start: 2022-10-19 | End: 2023-01-17

## 2022-10-19 NOTE — ASSESSMENT & PLAN NOTE
Treatment options reviewed  Recommend starting Semaglutide 0 25 mg daily and increase after 1 month to 0 5 mg   Recommend weight check in 2 months

## 2022-10-19 NOTE — TELEPHONE ENCOUNTER
Patient called she states that the script for [facility] requires prior authorization

## 2022-10-19 NOTE — PROGRESS NOTES
Assessment/Plan:  Started Lamisil for 3 months  Side effect profile medication reviewed  Recommend obtaining lab testing in 6 weeks to check liver enzymes  Time spent counseling and reviewing treatment plan and coordinating care as well as documentation for symptoms was 30 minutes  1  Class 2 obesity due to excess calories without serious comorbidity with body mass index (BMI) of 36 0 to 36 9 in adult  Assessment & Plan:  Treatment options reviewed  Recommend starting Semaglutide 0 25 mg daily and increase after 1 month to 0 5 mg   Recommend weight check in 2 months  Orders:  -     Semaglutide-Weight Management (WEGOVY) 0 25 MG/0 5ML; Inject 0 5 mL (0 25 mg total) under the skin once a week    2  Onychomycosis  -     terbinafine (LamISIL) 250 mg tablet; Take 1 tablet (250 mg total) by mouth daily  -     Comprehensive metabolic panel    3  Encounter for screening mammogram for malignant neoplasm of breast    4  Need for hepatitis C screening test  -     Hepatitis C Antibody ([facility]); Future    5  Screening for HIV (human immunodeficiency virus)  -     HIV 1/2 Antigen/Antibody (4th Generation) w Reflex [facility]; Future    6  Screening for cervical cancer  -     Ambulatory Referral to Gynecology; Future    7  Elevated glucose  -     POCT hemoglobin A1c    8  Hypokalemia  -     Comprehensive metabolic panel        Subjective:      Patient ID: Haleigh Cortez is a 63 y o  female  Patient seen for multiple concerns  She has toenail fungus that she would like to have treated  She has been using topical clotrimazole for several months without success  Her chief concern however is her weight  She has been trying diet and exercise without much success and would like to try a medication like Semaglutide  Patient was also recently in the emergency room 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 warm placed with THC  She was found to have elevated glucose and low potassium    She is feeling much better but is due for an A1c  The following portions of the patient's history were reviewed and updated as appropriate: allergies, current medications, past family history, past medical history, past social history, past surgical history, and problem list     Review of Systems   Constitutional: Negative  HENT: Negative  Eyes: Negative  Respiratory: Negative  Cardiovascular: Negative  Gastrointestinal: Negative  Endocrine: Negative  Genitourinary: Negative  Musculoskeletal: Negative  Skin: Negative  Allergic/Immunologic: Negative  Neurological: Negative  Hematological: Negative  Psychiatric/Behavioral: Negative  Objective:      /74 (BP Location: Left arm, Patient Position: Sitting, Cuff Size: Standard)   Pulse 74   Temp 97 9 °F (36 6 °C) (Temporal)   Resp 16   Ht 5' 5" (1 651 m)   Wt 100 kg (220 lb 12 8 oz)   LMP  (LMP Unknown)   SpO2 97%   BMI 36 74 kg/m²          Physical Exam  Vitals reviewed  Constitutional:       Appearance: She is well-developed  HENT:      Head: Normocephalic and atraumatic  Right Ear: External ear normal  Tympanic membrane is not erythematous or bulging  Left Ear: External ear normal  Tympanic membrane is not erythematous or bulging  Nose: Nose normal       Mouth/Throat:      Mouth: No oral lesions  Pharynx: No oropharyngeal exudate  Eyes:      General: No scleral icterus  Right eye: No discharge  Left eye: No discharge  Conjunctiva/sclera: Conjunctivae normal    Neck:      Thyroid: No thyromegaly  Cardiovascular:      Rate and Rhythm: Normal rate and regular rhythm  Heart sounds: Normal heart sounds  No murmur heard  No friction rub  No gallop  Pulmonary:      Effort: Pulmonary effort is normal  No respiratory distress  Breath sounds: No wheezing or rales  Chest:      Chest wall: No tenderness     Abdominal:      General: Bowel sounds are normal  There is no distension  Palpations: Abdomen is soft  There is no mass  Tenderness: There is no abdominal tenderness  There is no guarding or rebound  Musculoskeletal:         General: No tenderness or deformity  Normal range of motion  Cervical back: Normal range of motion and neck supple  Lymphadenopathy:      Cervical: No cervical adenopathy  Skin:     General: Skin is warm and dry  Coloration: Skin is not pale  Findings: No erythema or rash  Neurological:      Mental Status: She is alert and oriented to person, place, and time  Cranial Nerves: No cranial nerve deficit  Motor: No abnormal muscle tone  Coordination: Coordination normal       Deep Tendon Reflexes: Reflexes are normal and symmetric     Psychiatric:         Behavior: Behavior normal

## 2022-10-20 ENCOUNTER — TELEPHONE (OUTPATIENT)
Dept: ADMINISTRATIVE | Facility: OTHER | Age: 62
End: 2022-10-20

## 2022-10-20 NOTE — TELEPHONE ENCOUNTER
----- Message from Susie Spears MA sent at 10/19/2022  3:33 PM EDT -----  Regarding: care gap request  10/19/22 3:33 PM    Hello, our patient attached above has had Mammogram completed/performed  Please assist in updating the patient chart by making an External outreach to [facility] facility located in [place]   The date of service is 4/ 2022    Thank you,  [address]

## 2022-10-21 NOTE — TELEPHONE ENCOUNTER
Prior Auth submitted via [facility]: [id number] for Semaglutide-Weight Management (WEGOVY) 0 25 MG/0 5ML

## 2022-10-21 NOTE — TELEPHONE ENCOUNTER
Upon review of the In Basket request we were able to locate, review, and update the patient chart as requested for Mammogram     Any additional questions or concerns should be emailed to the Practice Liaisons via the appropriate education email address, please do not reply via In Basket      Thank you  Kristian Lara

## 2022-12-05 ENCOUNTER — VBI (OUTPATIENT)
Dept: ADMINISTRATIVE | Facility: OTHER | Age: 62
End: 2022-12-05

## 2023-01-10 ENCOUNTER — HOSPITAL ENCOUNTER (OUTPATIENT)
Dept: RADIOLOGY | Facility: HOSPITAL | Age: 63
Discharge: HOME | End: 2023-01-10
Attending: ORTHOPAEDIC SURGERY

## 2023-01-10 ENCOUNTER — OFFICE VISIT (OUTPATIENT)
Dept: OBGYN CLINIC | Facility: CLINIC | Age: 63
End: 2023-01-10

## 2023-01-10 VITALS — WEIGHT: 218 LBS | BODY MASS INDEX: 35.03 KG/M2 | HEIGHT: 66 IN

## 2023-01-10 DIAGNOSIS — M22.2X2 PATELLOFEMORAL SYNDROME OF LEFT KNEE: Primary | ICD-10-CM

## 2023-01-10 DIAGNOSIS — M17.12 PRIMARY OSTEOARTHRITIS OF LEFT KNEE: ICD-10-CM

## 2023-01-10 DIAGNOSIS — M25.562 CHRONIC PAIN OF LEFT KNEE: ICD-10-CM

## 2023-01-10 DIAGNOSIS — G89.29 CHRONIC PAIN OF LEFT KNEE: ICD-10-CM

## 2023-01-10 NOTE — PROGRESS NOTES
Assessment:  1  Chronic pain of left knee  XR knee 3 vw left non injury      2  Left knee osteoarthritis & patellofemoral syndrome    Plan:    · Patient has left knee osteoarthritis and patellofemoral syndrome   · Weightbearing as tolerated left lower extremity   · Provided patient with VMO strengthening exercises   · PT ordering was giving out today for left knee  patellofemoral syndrome aggressive VMO strengthening exercises and gait training   · Recommended patient to use Voltaren gel over the knee  · Continue taking Tylenol for pain relief  · If pain persists, may consider ordering MRI left knee in the future      The above stated was discussed in layman's terms and the patient expressed understanding  All questions were answered to the patient's satisfaction  Subjective:   Supa Lopez is a 63 y o  female who presents today consultation for left knee  She was referred by her PCP dr Nelly Dudley  She has been having left knee pain since October with repetitive motion going up and down the ladder  She is also having pain with walking, going up and down steps and walking  Lajean Cassette She was  treating at [facility] and had a left knee steroid injection on  and had relief for two weeks  She is having pain over medial aspect of the left knee  She does state catching feeling at times  She has been taking Tylenol for pain relief  She notes occasional numbness left lower leg         Review of systems negative unless otherwise specified in HPI    Past Medical History:   Diagnosis Date   • Environmental allergies        Past Surgical History:   Procedure Laterality Date   • ARTHROSCOPIC REPAIR ACL Right    •  SECTION     • CORRECTION HAMMER TOE Right     arthroplasty, last assessed 10/27/2015   • KNEE ARTHROPLASTY Right      last assessed 10/27/2015, with medial meniscus repair   • PARTIAL HYSTERECTOMY      with bladder suspension   • SEPTOPLASTY     • TONSILLECTOMY         Family History   Problem Relation Age of Onset   • Arthritis Mother    • Hypertension Father        Social History     Occupational History   • Not on file   Tobacco Use   • Smoking status: Never   • Smokeless tobacco: Never   Vaping Use   • Vaping Use: Never used   Substance and Sexual Activity   • Alcohol use: Yes     Comment: social   • Drug use: No   • Sexual activity: Yes         Current Outpatient Medications:   •  terbinafine (LamISIL) 250 mg tablet, Take 1 tablet (250 mg total) by mouth daily, Disp: 90 tablet, Rfl: 0  •  azelastine (ASTELIN) 0 1 % nasal spray, 1 spray into each nostril 2 (two) times a day (Patient not taking: Reported on 1/10/2023), Disp: 30 mL, Rfl: 11  •  Semaglutide-Weight Management (WEGOVY) 0 25 MG/0 5ML, Inject 0 5 mL (0 25 mg total) under the skin once a week (Patient not taking: Reported on 1/10/2023), Disp: 6 mL, Rfl: 3    Allergies   Allergen Reactions   • Molds & Smuts Allergic Rhinitis            Vitals:       Objective:            Physical Exam  Physical Exam:      General Appearance:    Alert, cooperative, no distress, appears stated age   Head:    Normocephalic, without obvious abnormality, atraumatic   Eyes:    conjunctiva/corneas clear, both eyes         Nose:   Nares normal, septum midline, no drainage    Throat:   Lips normal; teeth and gums normal   Neck:    symmetrical, trachea midline, ;     thyroid:  no enlargement/   Back:     Symmetric, no curvature, ROM normal   Lungs:   No audible wheezing or labored breathing   Chest Wall:    No tenderness or deformity    Heart:    Regular rate and rhythm                         Pulses:   2+ and symmetric all extremities   Skin:   Skin color, texture, turgor normal, no rashes or lesions   Neurologic:   normal strength, sensation and reflexes     throughout                       Ortho Exam  Left knee   Skin intact  No erythema  No effusion   Full extension   Flexion 115/ pain with deep flexion   Negative Patella grind test  TTP medial joint line   TTP VMO   Stable to varus and valgus stress   Negative Lachman and posterior Drawer test   Neurovascularly Intact Distally     Diagnostics, reviewed and taken today if performed as documented: The attending physician has personally reviewed the pertinent films in PACS and interpretation is as follows: x-ray left knee demonstrates medial compartment joint space narrowing, lateral patella tilt, patellofemoral arthritis with osteophyte formation       Procedures, if performed today:    Procedures    None performed      Scribe Attestation    I,:  Liana Weber am acting as a scribe while in the presence of the attending physician :       I,:  Cynthia Corona MD personally performed the services described in this documentation    as scribed in my presence :             Portions of the record may have been created with voice recognition software  Occasional wrong word or "sound a like" substitutions may have occurred due to the inherent limitations of voice recognition software  Read the chart carefully and recognize, using context, where substitutions have occurred

## 2023-04-27 NOTE — RESULT ENCOUNTER NOTE
Please call patient and inform them that mammogram is normal.  Recommend recheck mammogram in 1 year.

## 2023-05-24 ENCOUNTER — TELEPHONE (OUTPATIENT)
Dept: OBGYN CLINIC | Facility: CLINIC | Age: 63
End: 2023-05-24

## 2023-06-14 ENCOUNTER — OFFICE VISIT (OUTPATIENT)
Dept: OBGYN CLINIC | Facility: CLINIC | Age: 63
End: 2023-06-14
Payer: COMMERCIAL

## 2023-06-14 VITALS
HEART RATE: 69 BPM | BODY MASS INDEX: 36.16 KG/M2 | DIASTOLIC BLOOD PRESSURE: 85 MMHG | HEIGHT: 66 IN | WEIGHT: 225 LBS | SYSTOLIC BLOOD PRESSURE: 129 MMHG

## 2023-06-14 DIAGNOSIS — M17.12 PRIMARY OSTEOARTHRITIS OF LEFT KNEE: ICD-10-CM

## 2023-06-14 DIAGNOSIS — M22.2X2 PATELLOFEMORAL SYNDROME OF LEFT KNEE: Primary | ICD-10-CM

## 2023-06-14 PROCEDURE — 99213 OFFICE O/P EST LOW 20 MIN: CPT | Performed by: ORTHOPAEDIC SURGERY

## 2023-06-14 RX ORDER — CELECOXIB 200 MG/1
200 CAPSULE ORAL DAILY PRN
Qty: 30 CAPSULE | Refills: 0 | Status: SHIPPED | OUTPATIENT
Start: 2023-06-14 | End: 2023-07-14

## 2023-06-14 NOTE — PROGRESS NOTES
63 y o female ***    Review of Systems  Review of systems negative unless otherwise specified in HPI    Past Medical History  Past Medical History:   Diagnosis Date   • Environmental allergies        Past Surgical History  Past Surgical History:   Procedure Laterality Date   • ARTHROSCOPIC REPAIR ACL Right    •  SECTION     • CORRECTION HAMMER TOE Right     arthroplasty, last assessed 10/27/2015   • KNEE ARTHROPLASTY Right      last assessed 10/27/2015, with medial meniscus repair   • PARTIAL HYSTERECTOMY      with bladder suspension   • SEPTOPLASTY     • TONSILLECTOMY         Current Medications  Current Outpatient Medications on File Prior to Visit   Medication Sig Dispense Refill   • azelastine (ASTELIN) 0 1 % nasal spray 1 spray into each nostril 2 (two) times a day (Patient not taking: Reported on 1/10/2023) 30 mL 11     No current facility-administered medications on file prior to visit  Recent Labs [facility]  0   Lab Value Date/Time    CRP <3 0 2022 1231    GLUCOSE 91 10/27/2015 1659    HCT 43 1 2022 1231    HCT 40 4 10/27/2015 1659    HGB 14 3 2022 1231    HGB 13 4 10/27/2015 1659    HGBA1C 5 7 10/19/2022 1616    HGBA1C 5 6 2022 1231    WBC 5 59 2022 1231    WBC 6 13 10/27/2015 1659         Physical exam  · General: Awake, Alert, Oriented  · Eyes: Pupils equal, round and reactive to light  · Heart: regular rate and rhythm  · Lungs: No audible wheezing  · Abdomen: soft  ***    Imaging  ***    Procedure  ***    Assessment/Plan:   63 y  o female ***

## 2023-06-14 NOTE — PROGRESS NOTES
63 y o female with left knee patellofemoral syndrome and knee osteoarthrits  The patient was prescribed quad strengthening x-rays at her last visit  She has been performing left knee x-rays with improvement of her anterior knee, but continues to have medial knee pain  She reports occasional buckling and catching  She has tried bracing and taping with some benefit  She states that her prior steroid injection only provided a few weeks of relief  Review of Systems  Review of systems negative unless otherwise specified in HPI    Past Medical History  Past Medical History:   Diagnosis Date   • Environmental allergies        Past Surgical History  Past Surgical History:   Procedure Laterality Date   • ARTHROSCOPIC REPAIR ACL Right    •  SECTION     • CORRECTION HAMMER TOE Right     arthroplasty, last assessed 10/27/2015   • KNEE ARTHROPLASTY Right      last assessed 10/27/2015, with medial meniscus repair   • PARTIAL HYSTERECTOMY      with bladder suspension   • SEPTOPLASTY     • TONSILLECTOMY         Current Medications  Current Outpatient Medications on File Prior to Visit   Medication Sig Dispense Refill   • azelastine (ASTELIN) 0 1 % nasal spray 1 spray into each nostril 2 (two) times a day (Patient not taking: Reported on 1/10/2023) 30 mL 11     No current facility-administered medications on file prior to visit         Recent Labs [facility])  0   Lab Value Date/Time    CRP <3 0 2022 1231    GLUCOSE 91 10/27/2015 1659    HCT 43 1 2022 1231    HCT 40 4 10/27/2015 1659    HGB 14 3 2022 1231    HGB 13 4 10/27/2015 1659    HGBA1C 5 7 10/19/2022 1616    HGBA1C 5 6 2022 1231    WBC 5 59 2022 1231    WBC 6 13 10/27/2015 1659         Physical exam  · General: Awake, Alert, Oriented  · Eyes: Pupils equal, round and reactive to light  · Heart: regular rate and rhythm  · Lungs: No audible wheezing  · Abdomen: soft  LLE  - skin intact  - TTP medial joint line  - knee ROM 0-120 deg  - nontender anterior knee  - Motor and sensation intact  - Extremity warm and adequately perfused    Imaging  No new imaging    Procedure  None    Assessment/Plan:   63 y  o female with left patellofemoral syndrome and knee osteoarthritis  Discussion was had with the patient regarding treatment options for her patellofemoral syndrome and knee osteoarthritis  Her patellofemoral syndrome symptoms have significantly improved and she was encouraged to continue her quad strengthening exercises  She did not have significant benefit from the steroid injection previously and so she would like to hold off at this time  She is interested in visco supplementation injections  Pre authorization placed  Pain rated 9/10    WBAT  NSAIDs PRN - Celebrex script provided  Continue quad strengthening exercises  Visco preauthorization submitted  RTC once visco preauthorization approved

## 2023-06-21 ENCOUNTER — TELEPHONE (OUTPATIENT)
Dept: OBGYN CLINIC | Facility: HOSPITAL | Age: 63
End: 2023-06-21

## 2023-06-21 NOTE — TELEPHONE ENCOUNTER
Caller: Silvia     Doctor: Russell Client    Reason for call:  Patient received a call from [email or web address] and they mentioned her injection cost  Patient is confused on why we are using this pharmacy and asking about the cost  They asked if we can use     [address]     Call back#: [phone]

## 2023-07-03 ENCOUNTER — PROCEDURE VISIT (OUTPATIENT)
Dept: OBGYN CLINIC | Facility: CLINIC | Age: 63
End: 2023-07-03
Payer: COMMERCIAL

## 2023-07-03 VITALS
BODY MASS INDEX: 36.32 KG/M2 | OXYGEN SATURATION: 98 % | DIASTOLIC BLOOD PRESSURE: 80 MMHG | HEIGHT: 66 IN | SYSTOLIC BLOOD PRESSURE: 132 MMHG | HEART RATE: 71 BPM

## 2023-07-03 DIAGNOSIS — M17.12 PRIMARY OSTEOARTHRITIS OF LEFT KNEE: Primary | ICD-10-CM

## 2023-07-03 PROCEDURE — 20610 DRAIN/INJ JOINT/BURSA W/O US: CPT | Performed by: PHYSICIAN ASSISTANT

## 2023-07-03 NOTE — PROGRESS NOTES
Assessment/Plan   Diagnoses and all orders for this visit:    Primary osteoarthritis of left knee    - Durolane injection today  - Ice as needed  - Follow up with Dr. Len Marquez   Patient ID: Zane Crigler is a 63 y.o. female. Vitals:    23 1317   BP: 132/80   Pulse: 71   SpO2: 98%     67yo female comes in for a left knee durolane injection. She is a patient of Dr. Ariel Gallo for osteoarthritis. No new injury or complaint today. The following portions of the patient's history were reviewed and updated as appropriate: allergies, current medications, past family history, past medical history, past social history, past surgical history and problem list.    Review of Systems  Ortho Exam  Past Medical History:   Diagnosis Date   • Environmental allergies      Past Surgical History:   Procedure Laterality Date   • ARTHROSCOPIC REPAIR ACL Right    •  SECTION     • CORRECTION HAMMER TOE Right     arthroplasty, last assessed 10/27/2015   • KNEE ARTHROPLASTY Right      last assessed 10/27/2015, with medial meniscus repair   • PARTIAL HYSTERECTOMY      with bladder suspension   • SEPTOPLASTY     • TONSILLECTOMY       Family History   Problem Relation Age of Onset   • Arthritis Mother    • Hypertension Father      Social History     Occupational History   • Not on file   Tobacco Use   • Smoking status: Never   • Smokeless tobacco: Never   Vaping Use   • Vaping Use: Never used   Substance and Sexual Activity   • Alcohol use: Yes     Comment: social   • Drug use: No   • Sexual activity: Yes       Review of Systems   Constitutional: Negative. HENT: Negative. Eyes: Negative. Respiratory: Negative. Cardiovascular: Negative. Gastrointestinal: Negative. Endocrine: Negative. Genitourinary: Negative. Musculoskeletal: As below. .   Allergic/Immunologic: Negative. Neurological: Negative. Hematological: Negative. Psychiatric/Behavioral: Negative.         Objective Physical Exam      · Constitutional: Awake, Alert, Oriented  · Eyes: EOMI  · Psych: Mood and affect appropriate  · Heart: regular rate   · Lungs: No audible wheezing  · Abdomen: No guarding  · Lymph: no lymphedema  • left Knee:  - Appearance  • No swelling, discoloration, deformity, or ecchymosis  - Effusion  • none  - NVI distally          Large joint arthrocentesis: L knee  Universal Protocol:  Consent: Verbal consent obtained. Risks and benefits: risks, benefits and alternatives were discussed  Consent given by: patient  Time out: Immediately prior to procedure a "time out" was called to verify the correct patient, procedure, equipment, support staff and site/side marked as required. Timeout called at: 7/3/2023 2:10 PM.  Patient understanding: patient states understanding of the procedure being performed  Site marked: the operative site was marked  Radiology Images displayed and confirmed.  If images not available, report reviewed: imaging studies available  Patient identity confirmed: verbally with patient    Supporting Documentation  Indications: pain   Procedure Details  Location: knee - L knee  Needle size: 22 G  Ultrasound guidance: no  Approach: lateral  Medications administered: 3 mL sodium hyaluronate 60 MG/3ML    Patient tolerance: patient tolerated the procedure well with no immediate complications  Dressing:  Sterile dressing applied

## 2023-08-31 ENCOUNTER — RA CDI HCC (OUTPATIENT)
Dept: OTHER | Facility: HOSPITAL | Age: 63
End: 2023-08-31

## 2023-09-05 ENCOUNTER — OFFICE VISIT (OUTPATIENT)
Dept: FAMILY MEDICINE CLINIC | Facility: CLINIC | Age: 63
End: 2023-09-05
Payer: COMMERCIAL

## 2023-09-05 VITALS
DIASTOLIC BLOOD PRESSURE: 60 MMHG | HEIGHT: 66 IN | BODY MASS INDEX: 34.72 KG/M2 | OXYGEN SATURATION: 99 % | WEIGHT: 216 LBS | HEART RATE: 62 BPM | TEMPERATURE: 98.2 F | SYSTOLIC BLOOD PRESSURE: 111 MMHG

## 2023-09-05 DIAGNOSIS — E66.01 CLASS 2 SEVERE OBESITY WITH SERIOUS COMORBIDITY AND BODY MASS INDEX (BMI) OF 35.0 TO 35.9 IN ADULT, UNSPECIFIED OBESITY TYPE: ICD-10-CM

## 2023-09-05 DIAGNOSIS — R73.03 PREDIABETES: ICD-10-CM

## 2023-09-05 DIAGNOSIS — E66.01 CLASS 2 SEVERE OBESITY WITH SERIOUS COMORBIDITY AND BODY MASS INDEX (BMI) OF 35.0 TO 35.9 IN ADULT, UNSPECIFIED OBESITY TYPE (HCC): ICD-10-CM

## 2023-09-05 DIAGNOSIS — M17.12 PRIMARY OSTEOARTHRITIS OF LEFT KNEE: ICD-10-CM

## 2023-09-05 DIAGNOSIS — M22.2X2 PATELLOFEMORAL SYNDROME OF LEFT KNEE: ICD-10-CM

## 2023-09-05 DIAGNOSIS — B35.1 ONYCHOMYCOSIS: ICD-10-CM

## 2023-09-05 DIAGNOSIS — Z00.00 WELL ADULT EXAM: Primary | ICD-10-CM

## 2023-09-05 PROCEDURE — 99396 PREV VISIT EST AGE 40-64: CPT | Performed by: FAMILY MEDICINE

## 2023-09-05 RX ORDER — MELOXICAM 15 MG/1
15 TABLET ORAL DAILY
Qty: 30 TABLET | Refills: 0 | Status: SHIPPED | OUTPATIENT
Start: 2023-09-05

## 2023-09-05 RX ORDER — SEMAGLUTIDE 0.25 MG/.5ML
INJECTION, SOLUTION SUBCUTANEOUS
Refills: 0 | OUTPATIENT
Start: 2023-09-05

## 2023-09-05 RX ORDER — TERBINAFINE HYDROCHLORIDE 250 MG/1
250 TABLET ORAL DAILY
Qty: 90 TABLET | Refills: 0 | Status: SHIPPED | OUTPATIENT
Start: 2023-09-05 | End: 2023-12-04

## 2023-09-05 RX ORDER — CELECOXIB 200 MG/1
200 CAPSULE ORAL DAILY PRN
Qty: 30 CAPSULE | Refills: 0 | Status: CANCELLED | OUTPATIENT
Start: 2023-09-05 | End: 2023-10-05

## 2023-09-05 NOTE — PROGRESS NOTES
Assessment/Plan: With onychomycotic lesions of the toenails. She had done well on Lamisil in the past and tolerated this well. 1. Well adult exam    2. Patellofemoral syndrome of left knee    3. Primary osteoarthritis of left knee  -     meloxicam (MOBIC) 15 mg tablet; Take 1 tablet (15 mg total) by mouth daily    4. Prediabetes  -     Semaglutide-Weight Management (WEGOVY) 0.25 MG/0.5ML; Inject 0.5 mL (0.25 mg total) under the skin once a week    5. Class 2 severe obesity with serious comorbidity and body mass index (BMI) of 35.0 to 35.9 in adult, unspecified obesity type ([address])  -     Semaglutide-Weight Management (WEGOVY) 0.25 MG/0.5ML; Inject 0.5 mL (0.25 mg total) under the skin once a week    6. Onychomycosis  -     Comprehensive metabolic panel  -     terbinafine (LamISIL) 250 mg tablet; Take 1 tablet (250 mg total) by mouth daily          Subjective:      Patient ID: Lida Reyes is a 63 y.o. female. Patient here for well check. She does have some fungal infections of the nails. Depression Screening and Follow-up Plan: Patient was screened for depression during today's encounter. They screened negative with a PHQ-2 score of 0. The following portions of the patient's history were reviewed and updated as appropriate: allergies, current medications, past family history, past medical history, past social history, past surgical history, and problem list.    Review of Systems   Constitutional: Negative. HENT: Negative. Eyes: Negative. Respiratory: Negative. Cardiovascular: Negative. Gastrointestinal: Negative. Endocrine: Negative. Genitourinary: Negative. Musculoskeletal: Positive for arthralgias. Skin: Negative. Allergic/Immunologic: Negative. Neurological: Negative. Hematological: Negative. Psychiatric/Behavioral: Negative.           Objective:      /60 (BP Location: Left arm, Patient Position: Sitting, Cuff Size: Standard)   Pulse 62   Temp 98.2 °F (36.8 °C) (Tympanic)   Ht 5' 6" (1.676 m)   Wt 98 kg (216 lb)   LMP  (LMP Unknown)   SpO2 99%   BMI 34.86 kg/m²          Physical Exam  Vitals reviewed. Constitutional:       Appearance: She is well-developed. HENT:      Head: Normocephalic and atraumatic. Right Ear: External ear normal. Tympanic membrane is not erythematous or bulging. Left Ear: External ear normal. Tympanic membrane is not erythematous or bulging. Nose: Nose normal.      Mouth/Throat:      Mouth: No oral lesions. Pharynx: No oropharyngeal exudate. Eyes:      General: No scleral icterus. Right eye: No discharge. Left eye: No discharge. Conjunctiva/sclera: Conjunctivae normal.   Neck:      Thyroid: No thyromegaly. Cardiovascular:      Rate and Rhythm: Normal rate and regular rhythm. Heart sounds: Normal heart sounds. No murmur heard. No friction rub. No gallop. Pulmonary:      Effort: Pulmonary effort is normal. No respiratory distress. Breath sounds: No wheezing or rales. Chest:      Chest wall: No tenderness. Abdominal:      General: Bowel sounds are normal. There is no distension. Palpations: Abdomen is soft. There is no mass. Tenderness: There is no abdominal tenderness. There is no guarding or rebound. Musculoskeletal:         General: No tenderness or deformity. Normal range of motion. Cervical back: Normal range of motion and neck supple. Comments: Dystrophic great toenails bilaterally   Lymphadenopathy:      Cervical: No cervical adenopathy. Skin:     General: Skin is warm and dry. Coloration: Skin is not pale. Findings: No erythema or rash. Neurological:      Mental Status: She is alert and oriented to person, place, and time. Cranial Nerves: No cranial nerve deficit. Motor: No abnormal muscle tone. Coordination: Coordination normal.      Deep Tendon Reflexes: Reflexes are normal and symmetric.    Psychiatric: Behavior: Behavior normal.

## 2023-09-06 RX ORDER — SEMAGLUTIDE 0.25 MG/.5ML
INJECTION, SOLUTION SUBCUTANEOUS
Refills: 0 | OUTPATIENT
Start: 2023-09-06

## 2023-09-13 ENCOUNTER — OFFICE VISIT (OUTPATIENT)
Dept: OBGYN CLINIC | Facility: CLINIC | Age: 63
End: 2023-09-13
Payer: COMMERCIAL

## 2023-09-13 VITALS
HEART RATE: 81 BPM | WEIGHT: 219 LBS | BODY MASS INDEX: 35.2 KG/M2 | DIASTOLIC BLOOD PRESSURE: 90 MMHG | HEIGHT: 66 IN | SYSTOLIC BLOOD PRESSURE: 122 MMHG

## 2023-09-13 DIAGNOSIS — M22.2X2 PATELLOFEMORAL SYNDROME OF LEFT KNEE: Primary | ICD-10-CM

## 2023-09-13 PROCEDURE — 99213 OFFICE O/P EST LOW 20 MIN: CPT | Performed by: ORTHOPAEDIC SURGERY

## 2023-09-13 RX ORDER — CELECOXIB 100 MG/1
100 CAPSULE ORAL 2 TIMES DAILY
Qty: 30 CAPSULE | Refills: 0 | Status: SHIPPED | OUTPATIENT
Start: 2023-09-13

## 2023-09-13 NOTE — PROGRESS NOTES
Orthopedics          Reyna Lady 63 y.o. female MRN: [id number]      Chief Complaint:   left knee pain    HPI:   63 y. o.female complaining of left knee pain. Patient states mostly having pain in her left knee going up and down stairs. Patient was in physical therapy 5 months ago she has not participated in these exercises recently. She did have viscosupplementation injection which gave her 6 to 8 weeks family without pain since returned.   States she is able to walk on level ground however up and down inclines and stairs significantly increase her pain pain is anterior on her left knee worse with activity mildly relieved with rest.  She denies any change in numbness and tingling stability of her left knee                Review Of Systems:   · Skin: Normal  · Neuro: See HPI  · Musculoskeletal: See HPI  · All other systems reviewed and are negative    Past Medical History:   Past Medical History:   Diagnosis Date   • Environmental allergies        Past Surgical History:   Past Surgical History:   Procedure Laterality Date   • ARTHROSCOPIC REPAIR ACL Right    •  SECTION     • CORRECTION HAMMER TOE Right     arthroplasty, last assessed 10/27/2015   • KNEE ARTHROPLASTY Right      last assessed 10/27/2015, with medial meniscus repair   • PARTIAL HYSTERECTOMY      with bladder suspension   • SEPTOPLASTY     • TONSILLECTOMY         Family History:  Family history reviewed and non-contributory  Family History   Problem Relation Age of Onset   • Arthritis Mother    • Hypertension Father          Social History:  Social History     Socioeconomic History   • Marital status: /Civil Union     Spouse name: Not on file   • Number of children: Not on file   • Years of education: Not on file   • Highest education level: Not on file   Occupational History   • Not on file   Tobacco Use   • Smoking status: Never   • Smokeless tobacco: Never   Vaping Use   • Vaping Use: Never used   Substance and Sexual Activity   • Alcohol use: Yes     Comment: social   • Drug use: No   • Sexual activity: Yes   Other Topics Concern   • Not on file   Social History Narrative   • Not on file     Social Determinants of Health     Financial Resource Strain: Not on file   Food Insecurity: Not on file   Transportation Needs: Not on file   Physical Activity: Not on file   Stress: Not on file   Social Connections: Not on file   Intimate Partner Violence: Not on file   Housing Stability: Not on file       Allergies: Allergies   Allergen Reactions   • Molds & Smuts Allergic Rhinitis       Labs:  0   Lab Value Date/Time    HCT 43.1 03/09/2022 1231    HCT 41.8 10/23/2019 1611    HCT 40.4 10/27/2015 1659    HGB 14.3 03/09/2022 1231    HGB 13.3 10/23/2019 1611    HGB 13.4 10/27/2015 1659    WBC 5.59 03/09/2022 1231    WBC 6.82 10/23/2019 1611    WBC 6.13 10/27/2015 1659    CRP <3.0 03/09/2022 1231       Meds:    Current Outpatient Medications:   •  azelastine (ASTELIN) 0.1 % nasal spray, 1 spray into each nostril 2 (two) times a day (Patient not taking: Reported on 1/10/2023), Disp: 30 mL, Rfl: 11  •  meloxicam (MOBIC) 15 mg tablet, Take 1 tablet (15 mg total) by mouth daily, Disp: 30 tablet, Rfl: 0  •  Semaglutide-Weight Management (WEGOVY) 0.25 MG/0.5ML, Inject 0.5 mL (0.25 mg total) under the skin once a week, Disp: 2 mL, Rfl: 0  •  terbinafine (LamISIL) 250 mg tablet, Take 1 tablet (250 mg total) by mouth daily, Disp: 90 tablet, Rfl: 0      Physical Exam:   There were no vitals filed for this visit.     General Appearance:    Alert, cooperative, no distress, appears stated age   Head:    Normocephalic, without obvious abnormality, atraumatic   Eyes:    conjunctiva/corneas clear, both eyes        Nose:   Nares normal, septum midline, no drainage    Throat:   Lips normal; teeth and gums normal   Neck:    symmetrical, trachea midline, ;     thyroid:  no enlargement/   Back:     Symmetric, no curvature, ROM normal   Lungs:   No audible wheezing or labored breathing   Chest Wall:    No tenderness or deformity    Heart:    Regular rate and rhythm               Pulses:   2+ and symmetric all extremities   Skin:   Skin color, texture, turgor normal, no rashes or lesions   Neurologic:   normal strength, sensation and reflexes     throughout       Musculoskeletal: left lower extremity  · On examination of the left knee there is no effusion, no erythema. Range of motion to full active extension and flexion to greater than 120°. Pain on palpation medial and lateral joint lines. There is crepitus with range of motion, no warmth to palpation, bony enlargement noted. No pain on palpation pes anserine bursa region or distal iliotibial band. Stable to varus and valgus stress without pain or gapping. Negative anterior and posterior drawer testing. Sensation intact distal pulses present.      _*_*_*_*_*_*_*_*_*_*_*_*_*_*_*_*_*_*_*_*_*_*_*_*_*_*_*_*_*_*_*_*_*_*_*_*_*_*_*_*_*    Assessment:  63 y. o.female with left knee pain due to arthritis and patellofemoral syndrome    Plan:   · Weight bearing as tolerated  left lower extremity  · Patient provided with home range of motion stretching strengthening exercise specifically VMO strengthening exercises  · Hold off on any steroid injections as she has not had significant pain relief with these in the past  · Patient may be candidate for viscosupplementation injection 6 months after her recent injection  · Follow up in 3 months or sooner if needed      Quentin Roman PA-C                    .

## 2023-10-07 DIAGNOSIS — M17.12 PRIMARY OSTEOARTHRITIS OF LEFT KNEE: ICD-10-CM

## 2023-10-09 RX ORDER — MELOXICAM 15 MG/1
15 TABLET ORAL DAILY
Qty: 30 TABLET | Refills: 0 | Status: SHIPPED | OUTPATIENT
Start: 2023-10-09 | End: 2023-10-16 | Stop reason: ALTCHOICE

## 2023-10-16 ENCOUNTER — OFFICE VISIT (OUTPATIENT)
Dept: FAMILY MEDICINE CLINIC | Facility: CLINIC | Age: 63
End: 2023-10-16
Payer: COMMERCIAL

## 2023-10-16 VITALS
BODY MASS INDEX: 33.85 KG/M2 | TEMPERATURE: 97.8 F | HEART RATE: 78 BPM | OXYGEN SATURATION: 97 % | DIASTOLIC BLOOD PRESSURE: 80 MMHG | HEIGHT: 66 IN | WEIGHT: 210.6 LBS | SYSTOLIC BLOOD PRESSURE: 130 MMHG

## 2023-10-16 DIAGNOSIS — R35.0 URINE FREQUENCY: Primary | ICD-10-CM

## 2023-10-16 DIAGNOSIS — R30.9 PAINFUL URINATION: ICD-10-CM

## 2023-10-16 DIAGNOSIS — R31.9 HEMATURIA, UNSPECIFIED TYPE: ICD-10-CM

## 2023-10-16 LAB
SL AMB  POCT GLUCOSE, UA: NORMAL
SL AMB LEUKOCYTE ESTERASE,UA: ABNORMAL
SL AMB POCT BILIRUBIN,UA: NEGATIVE
SL AMB POCT BLOOD,UA: ABNORMAL
SL AMB POCT CLARITY,UA: ABNORMAL
SL AMB POCT COLOR,UA: YELLOW
SL AMB POCT KETONES,UA: NEGATIVE
SL AMB POCT NITRITE,UA: NEGATIVE
SL AMB POCT PH,UA: 5
SL AMB POCT SPECIFIC GRAVITY,UA: 1.02
SL AMB POCT URINE PROTEIN: ABNORMAL
SL AMB POCT UROBILINOGEN: NORMAL

## 2023-10-16 PROCEDURE — 99213 OFFICE O/P EST LOW 20 MIN: CPT | Performed by: FAMILY MEDICINE

## 2023-10-16 PROCEDURE — 81002 URINALYSIS NONAUTO W/O SCOPE: CPT | Performed by: FAMILY MEDICINE

## 2023-10-16 RX ORDER — NITROFURANTOIN 25; 75 MG/1; MG/1
100 CAPSULE ORAL 2 TIMES DAILY
Qty: 10 CAPSULE | Refills: 0 | Status: SHIPPED | OUTPATIENT
Start: 2023-10-16 | End: 2023-10-21

## 2023-10-16 NOTE — PROGRESS NOTES
Family Medicine Follow-Up Office Visit  Alisa Thurston 63 y.o. female   MRN: [id number] : 1960  ENCOUNTER: 10/19/2023 11:27 AM    Assessment and Plan   Urine frequency  Patient presents with right lower quadrant pain, urinary frequency and associated dysuria. Differential diagnosis includes but is not limited to UTI, nephrolithiasis, suspicion is low for appendicitis due to benign exam and normal vital signs    Urine dip with leukocytes and red blood cells noted nitrate negative    Plan: Will send urine sample for formal urinalysis with microscopy and urine culture for further evaluation. We will start empiric treatment for possible UTI with 5-day course of Macrobid  May consider CT renal stone study if symptoms persist despite antibiotic treatment or if calcium oxalate crystals noted on UA      Chief Complaint     Chief Complaint   Patient presents with   • Painful Urination   • Urinary Frequency   • Blood in Urine       History of Present Illness   Alisa Thurston is a 63y.o.-year-old female with past medical history of obesity, prediabetes, osteoarthritis and scoliosis who presents today for evaluation for possible UTI. Patient notes that she started on Saturday with symptoms of slight burning with urination. She also noted some lower abdominal tenderness at the gym. She notes that overall the symptoms have slightly been increasing. She notes increased urinary frequency. She did also note blood on the toilet tissue after urinating yesterday morning. She also notes cloudiness of urine. She denies any increase in urgency. No fevers or chills. Denies flank pains. Review of Systems   Review of Systems   Constitutional:  Negative for chills and fever. HENT:  Negative for congestion and sore throat. Respiratory:  Negative for cough and shortness of breath. Cardiovascular:  Negative for chest pain and palpitations. Gastrointestinal:  Positive for abdominal pain.  Negative for blood in stool, constipation, diarrhea, nausea and vomiting. Genitourinary:  Positive for dysuria, frequency, hematuria and pelvic pain. Negative for decreased urine volume, flank pain, urgency and vaginal discharge. Musculoskeletal:  Negative for arthralgias and myalgias. Skin:  Negative for rash. Neurological:  Negative for dizziness and headaches. Active Problem List     Patient Active Problem List   Diagnosis   • Idiopathic scoliosis   • Hair loss   • Obesity due to excess calories   • Primary osteoarthritis of left knee   • Urine frequency       Past Medical History, Past Surgical History, Family History, and Social History were reviewed and updated today as appropriate. Objective   /80 (BP Location: Left arm, Patient Position: Sitting, Cuff Size: Large)   Pulse 78   Temp 97.8 °F (36.6 °C) (Tympanic)   Ht 5' 6" (1.676 m)   Wt 95.5 kg (210 lb 9.6 oz)   LMP  (LMP Unknown)   SpO2 97%   BMI 33.99 kg/m²     Physical Exam  Constitutional:       General: She is not in acute distress. Appearance: She is well-developed. She is not ill-appearing. HENT:      Head: Normocephalic and atraumatic. Right Ear: External ear normal.      Left Ear: External ear normal.   Eyes:      General: No scleral icterus. Conjunctiva/sclera: Conjunctivae normal.   Cardiovascular:      Rate and Rhythm: Normal rate and regular rhythm. Heart sounds: Normal heart sounds. Pulmonary:      Effort: Pulmonary effort is normal. No respiratory distress. Breath sounds: Normal breath sounds. No wheezing. Abdominal:      General: Bowel sounds are normal. There is no distension. Palpations: Abdomen is soft. Tenderness: There is abdominal tenderness (Right lower quadrant tenderness). There is no right CVA tenderness, left CVA tenderness, guarding or rebound. Musculoskeletal:      Right lower leg: No edema. Left lower leg: No edema. Skin:     General: Skin is warm and dry.    Neurological: General: No focal deficit present. Mental Status: She is alert.    Psychiatric:         Mood and Affect: Mood normal.         Behavior: Behavior normal.         Pertinent Laboratory/Diagnostic Studies:  Lab Results   Component Value Date    GLUCOSE 91 10/27/2015    BUN 12 03/09/2022    CREATININE 0.91 03/09/2022    CALCIUM 9.8 03/09/2022     10/27/2015    K 4.3 03/09/2022    CO2 28 03/09/2022     03/09/2022     Lab Results   Component Value Date    ALT 30 03/09/2022    AST 18 03/09/2022    ALKPHOS 97 03/09/2022    BILITOT 0.46 10/27/2015       Lab Results   Component Value Date    WBC 5.59 03/09/2022    HGB 14.3 03/09/2022    HCT 43.1 03/09/2022    MCV 86 03/09/2022     03/09/2022       No results found for: "TSH"    Lab Results   Component Value Date    CHOL 200 10/27/2015     Lab Results   Component Value Date    TRIG 59 03/09/2022     Lab Results   Component Value Date     03/09/2022     Lab Results   Component Value Date    LDLCALC 136 (H) 03/09/2022     Lab Results   Component Value Date    HGBA1C 5.7 10/19/2022       Results for orders placed or performed in visit on 10/16/23   Urine culture    Specimen: Urine, Other   Result Value Ref Range    Urine Culture Result     Urinalysis with microscopic   Result Value Ref Range    Color UA YELLOW YELLOW    Urine Appearance CLOUDY (A) CLEAR    Specific Gravity 1.019 1.001 - 1.035    Ph < OR = 5.0 5.0 - 8.0    Glucose, Urine NEGATIVE NEGATIVE    Bilirubin, Urine NEGATIVE NEGATIVE    Ketone, Urine NEGATIVE NEGATIVE    Blood, Urine NEGATIVE NEGATIVE    Protein, Urine NEGATIVE NEGATIVE    Nitrites Urine NEGATIVE NEGATIVE    Leukocyte Esterase 2+ (A) NEGATIVE    SL AMB WBC, URINE 10-20 (A) < OR = 5 /HPF    RBC, Urine NONE SEEN < OR = 2 /HPF    Squamous Epithelial Cells 10-20 (A) < OR = 5 /HPF    Bacteria, UA NONE SEEN NONE SEEN /HPF    Calcium Oxalate Crystals MODERATE (A) NONE OR FEW /HPF    Uric Acid Crystals FEW NONE OR FEW /HPF Hyaline Casts NONE SEEN NONE SEEN /LPF    Comment(s)     POCT urine dip   Result Value Ref Range    LEUKOCYTE ESTERASE,UA ++     NITRITE,UA negative     SL AMB POCT UROBILINOGEN normal     POCT URINE PROTEIN trace      PH,UA 5     BLOOD,UA about 30     SPECIFIC GRAVITY,UA 1.020     KETONES,UA negative     BILIRUBIN,UA negative     GLUCOSE, UA normal      COLOR,UA yellow     CLARITY,UA cloudy        Orders Placed This Encounter   Procedures   • Urine culture   • Urinalysis with microscopic   • POCT urine dip         Current Medications     Current Outpatient Medications   Medication Sig Dispense Refill   • nitrofurantoin (MACROBID) 100 mg capsule Take 1 capsule (100 mg total) by mouth 2 (two) times a day for 5 days 10 capsule 0   • terbinafine (LamISIL) 250 mg tablet Take 1 tablet (250 mg total) by mouth daily 90 tablet 0     No current facility-administered medications for this visit.        ALLERGIES:  Allergies   Allergen Reactions   • Molds & Smuts Allergic Rhinitis       Health Maintenance     Health Maintenance   Topic Date Due   • Hepatitis C Screening  Never done   • HIV Screening  Never done   • DTaP,Tdap,and Td Vaccines (1 - Tdap) Never done   • Cervical Cancer Screening  Never done   • COVID-19 Vaccine (3 - Pfizer series) 04/21/2021   • BMI: Followup Plan  07/06/2023   • Influenza Vaccine (1) 09/01/2023   • Breast Cancer Screening: Mammogram  04/19/2024   • Depression Screening  09/05/2024   • Annual Physical  09/05/2024   • BMI: Adult  10/16/2024   • Colorectal Cancer Screening  08/12/2032   • Pneumococcal Vaccine: Pediatrics (0 to 5 Years) and At-Risk Patients (6 to 59 Years)  Aged Out   • HIB Vaccine  Aged Out   • IPV Vaccine  Aged Out   • Hepatitis A Vaccine  Aged Out   • Meningococcal ACWY Vaccine  Aged Out   • HPV Vaccine  Aged [employer] History   Administered Date(s) Administered   • COVID-19 PFIZER VACCINE 0.3 ML IM 02/03/2021, 02/24/2021   • Hep A, adult 10/09/2012   • INFLUENZA 10/09/2012, 10/06/2016, 11/04/2018   • Influenza Quadrivalent Preservative Free 3 years and older IM 10/27/2015   • Influenza, injectable, quadrivalent, preservative free 0.5 mL 10/30/2020   • Influenza, recombinant, quadrivalent,injectable, preservative free 10/23/2019, 10/29/2021   • Influenza, seasonal, injectable 11/07/2017   • Zoster Vaccine Recombinant 10/30/2020, 10/29/2021         Tae Estrella DO   [address] [facility]  10/19/2023  11:27 AM    Parts of this note were dictated using [employer] dictation software and may have sounds-like errors due to variation in pronunciation.

## 2023-10-18 ENCOUNTER — TELEPHONE (OUTPATIENT)
Dept: FAMILY MEDICINE CLINIC | Facility: CLINIC | Age: 63
End: 2023-10-18

## 2023-10-18 DIAGNOSIS — R35.0 URINE FREQUENCY: ICD-10-CM

## 2023-10-18 DIAGNOSIS — R30.9 PAINFUL URINATION: Primary | ICD-10-CM

## 2023-10-18 DIAGNOSIS — R82.998 CALCIUM OXALATE CRYSTALS IN URINE: ICD-10-CM

## 2023-10-18 DIAGNOSIS — R10.2 PELVIC PAIN: ICD-10-CM

## 2023-10-18 DIAGNOSIS — R31.9 HEMATURIA, UNSPECIFIED TYPE: ICD-10-CM

## 2023-10-18 LAB
APPEARANCE UR: ABNORMAL
BACTERIA UR QL AUTO: ABNORMAL /HPF
BILIRUB UR QL STRIP: NEGATIVE
CAOX CRY #/AREA URNS HPF: ABNORMAL /HPF
COLOR UR: YELLOW
GLUCOSE UR QL STRIP: NEGATIVE
HGB UR QL STRIP: NEGATIVE
HYALINE CASTS #/AREA URNS LPF: ABNORMAL /LPF
KETONES UR QL STRIP: NEGATIVE
LEUKOCYTE ESTERASE UR QL STRIP: ABNORMAL
NITRITE UR QL STRIP: NEGATIVE
PH UR STRIP: ABNORMAL [PH] (ref 5–8)
PROT UR QL STRIP: NEGATIVE
RBC #/AREA URNS HPF: ABNORMAL /HPF
SP GR UR STRIP: 1.02 (ref 1–1.03)
SQUAMOUS #/AREA URNS HPF: ABNORMAL /HPF
URATE CRY #/AREA URNS HPF: ABNORMAL /HPF
WBC #/AREA URNS HPF: ABNORMAL /HPF

## 2023-10-18 NOTE — TELEPHONE ENCOUNTER
Informed patient that urine culture is negative for UTI. May complete antibiotic course. Given hematuria with calcium oxalate crystals and continued pelvic discomfort would recommend CT renal stone study for further evaluation.

## 2023-10-19 PROBLEM — R35.0 URINE FREQUENCY: Status: ACTIVE | Noted: 2023-10-19

## 2023-10-19 NOTE — ASSESSMENT & PLAN NOTE
Patient presents with right lower quadrant pain, urinary frequency and associated dysuria. Differential diagnosis includes but is not limited to UTI, nephrolithiasis, suspicion is low for appendicitis due to benign exam and normal vital signs    Urine dip with leukocytes and red blood cells noted nitrate negative    Plan: Will send urine sample for formal urinalysis with microscopy and urine culture for further evaluation.   We will start empiric treatment for possible UTI with 5-day course of Macrobid  May consider CT renal stone study if symptoms persist despite antibiotic treatment or if calcium oxalate crystals noted on UA

## 2023-11-30 ENCOUNTER — VBI (OUTPATIENT)
Dept: ADMINISTRATIVE | Facility: OTHER | Age: 63
End: 2023-11-30

## 2024-05-23 ENCOUNTER — TELEPHONE (OUTPATIENT)
Age: 64
End: 2024-05-23

## 2024-05-23 NOTE — TELEPHONE ENCOUNTER
Pt called to add pre-op clearance for pelvic surgery (sched 8/13) which includes EKG to her already scheduled Pre Op OV on 5/28 (L knee surgery).    I explained pre-op appoint must be within 30 days of surgery.  She stated they told her to have the clearance right away.  I added it to appt note for 5/28 as she wished.  Advised her to verify with surgeon office prior to appt date.

## 2024-05-31 ENCOUNTER — TELEPHONE (OUTPATIENT)
Age: 64
End: 2024-05-31

## 2024-05-31 NOTE — TELEPHONE ENCOUNTER
Shari /[facility] orthopedic Specialist, request the status of the faxed clearance form,office note clearing patient for surgery, faxed sent 05/22/2024. Upon chart review/ media,nimisha to confirm media image, Patient is scheduled for surgery 06/19/2024,Fax# [phone],  Please advise Shari at [phone], if any further questions.

## 2024-05-31 NOTE — TELEPHONE ENCOUNTER
Spoke with pt she was here on 5/28 for pre op but appointment got canceled due to surgery being pushed back until August it is too soon to do clearance now call placed to Shari at [phone] [facility] that we did not receive any pre op clearance papers and that pt surgery was pushed back to aug.

## 2024-06-04 ENCOUNTER — OFFICE VISIT (OUTPATIENT)
Dept: OBGYN CLINIC | Facility: CLINIC | Age: 64
End: 2024-06-04
Payer: COMMERCIAL

## 2024-06-04 ENCOUNTER — APPOINTMENT (OUTPATIENT)
Dept: RADIOLOGY | Facility: AMBULARY SURGERY CENTER | Age: 64
End: 2024-06-04
Attending: ORTHOPAEDIC SURGERY
Payer: COMMERCIAL

## 2024-06-04 VITALS — WEIGHT: 197 LBS | BODY MASS INDEX: 31.66 KG/M2 | HEIGHT: 66 IN

## 2024-06-04 DIAGNOSIS — M20.5X1 CLAW TOE, RIGHT: Primary | ICD-10-CM

## 2024-06-04 DIAGNOSIS — M20.5X2 CLAW TOE, LEFT: ICD-10-CM

## 2024-06-04 DIAGNOSIS — M79.671 RIGHT FOOT PAIN: ICD-10-CM

## 2024-06-04 DIAGNOSIS — Z01.89 ENCOUNTER FOR LOWER EXTREMITY COMPARISON IMAGING STUDY: ICD-10-CM

## 2024-06-04 PROCEDURE — 73630 X-RAY EXAM OF FOOT: CPT

## 2024-06-04 PROCEDURE — 99214 OFFICE O/P EST MOD 30 MIN: CPT | Performed by: ORTHOPAEDIC SURGERY

## 2024-06-04 PROCEDURE — 73620 X-RAY EXAM OF FOOT: CPT

## 2024-06-04 NOTE — PATIENT INSTRUCTIONS
[employer], [employer], [employer] are good brands but I recommend going to a dedicate shoe store (not [employer] or [employer].) At these types of stores, they have experts that can fit you for shoes appropriate for your foot problem. Shoe choice is essential to solving/improving most types of foot pain.  Even after a surgery, good shoes are necessary to keep the foot as comfortable as possible.    [employer]  [address] [place] [address]  [phone]    [facility]  [address], [place] [address]  [phone]    [employer]  [address] [place] [address]  [phone]    [employer]   [address]  [phone]    [employer]  [address], [place] [address]  [phone]    [employer]  [address] [phone]    [facility]   [address] [place] [address]  [phone]    [employer]  [address] [place]  [phone]    [employer] [address], [id number] [phone]    [employer] [address], [address], [place] [address] [phone]

## 2024-06-04 NOTE — PROGRESS NOTES
James R Lachman, M.D.  Attending, Orthopaedic Surgery  Foot and Ankle  [facility]      ORTHOPAEDIC FOOT AND ANKLE CLINIC VISIT       Assessment:     Encounter Diagnoses   Name Primary?    Right foot pain     Encounter for lower extremity comparison imaging study     Claw toe, right Yes    Claw toe, left             Plan:   The patient verbalized understanding of exam findings and treatment plan. We engaged in the shared decision-making process and treatment options were discussed at length with the patient.   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    History of Present Illness:   Chief Complaint:   Chief Complaint   Patient presents with    Left Foot - Pain     Can't walk when it is uneven surface.     Right Foot - Pain     Toes are curling on both feet. The worse. Had surgery on in the past.        Lexi Azevedo is a 63 y.o. female who is being seen for bilateral second and fifth claw toe deformities.       Patient has a longstanding history of claw toe deformities bilaterally to her second and fifth toes.  Over the last 2 years her discomfort and pain has been slowly progressive and preventing her from taking the hikes that she wants to.  She states that the right side is worse than the left.  Pain is localized at second and fifth toe tips bilaterally with minimal radiating and described as sharp and severe.  "Patient denies numbness, tingling or radicular pain.  Denies history of neuropathy.  Patient does not smoke, does not have diabetes and does not take blood thinners.  Patient denies family history of anesthesia complications and has not had any complications with anesthesia.     Pain/symptom timing:  Worse during the day when active  Pain/symptom context:  Worse with activites and work  Pain/symptom modifying factors:  Rest makes better, activities make worse  Pain/symptom associated signs/symptoms: none    Prior treatment   NSAIDsYes    Injections No   Bracing/Orthotics No    Physical Therapy No     Orthopedic Surgical History:   Please see below    Past Medical History:  Past Medical History:   Diagnosis Date    Environmental allergies        Past Surgical History:   Procedure Laterality Date    ARTHROSCOPIC REPAIR ACL Right      SECTION      CORRECTION HAMMER TOE Right     arthroplasty, last assessed 10/27/2015    KNEE ARTHROPLASTY Right      last assessed 10/27/2015, with medial meniscus repair    PARTIAL HYSTERECTOMY      with bladder suspension    SEPTOPLASTY      TONSILLECTOMY         The patient has a family history of        Current Outpatient Medications:     conjugated estrogens (PREMARIN) 0.3 mg tablet, Take 0.3 mg by mouth daily Take daily for 21 days then do not take for 7 days., Disp: , Rfl:       Allergies   Allergen Reactions    Molds & Smuts Allergic Rhinitis       Review of Systems:  A comprehensive 14 point ROS was performed, reviewed, and the pertinent orthopaedic findings are documented in the HPI.    Physical Exam:   Ht 5' 6\" (1.676 m)   Wt 89.4 kg (197 lb)   LMP  (LMP Unknown)   BMI 31.80 kg/m²   General/Constitutional: No apparent distress: well-nourished and well developed.  Eyes: normal ocular motion  Lymphatic: No appreciable lymphadenopathy  Respiratory: Non-labored breathing  Vascular: No edema, swelling or tenderness, except as noted in detailed exam.  Integumentary: No " impressive skin lesions present, except as noted in detailed exam.  Neuro: No ataxia or tremors noted  Psych: Normal mood and affect, oriented to person, place and time. Appropriate affect.  Musculoskeletal: Normal, except as noted in detailed exam and in HPI.    Examination      Right Left   Gait Normal   Musculoskeletal Tender to palpation at tip of second and fifth toe Tender to palpation at tip of second and fifth toe   Skin Normal.   Normal.     Nails Normal Normal   Range of Motion  20 degrees dorsiflexion, 30 degrees plantarflexion  Subtalar motion: Normal 20 degrees dorsiflexion, 30 degrees plantarflexion  Subtalar motion: Normal   Stability Stable Stable   Muscle Strength 5/5 tibialis anterior  5/5 gastrocnemius-soleus  5/5 posterior tibialis  5/5 peroneal/eversion strength  5/5 EHL  5/5 FHL 5/5 tibialis anterior  5/5 gastrocnemius-soleus  5/5 posterior tibialis  5/5 peroneal/eversion strength  5/5 EHL  5/5 FHL   Neurologic Normal Normal   Sensation Intact to light touch throughout sural, saphenous, superficial peroneal, deep peroneal and medial/lateral plantar nerve distributions.  [place] 5.07 filament (10g) testing  deferred. Intact to light touch throughout sural, saphenous, superficial peroneal, deep peroneal and medial/lateral plantar nerve distributions.  [place] 5.07 filament (10g) testing  deferred.   Cardiovascular Brisk capillary refill < 2 seconds,intact DP and PT pulses Brisk capillary refill < 2 seconds,intact DP and PT pulses   Special Tests None None       Imaging Studies:   3 views of the bilateral feet were taken, reviewed and interpreted independently that demonstrate qualitative deformity of second and fifth toe bilaterally and with intact internalize hardware to right second PIP joint. Reviewed by me personally.         James R. Lachman, MD  Attending, Foot & Ankle Service  Department of Orthopaedic Surgery  [facility]      I personally  performed the service.    James R. Lachman, MD

## 2024-07-29 ENCOUNTER — CONSULT (OUTPATIENT)
Dept: FAMILY MEDICINE CLINIC | Facility: CLINIC | Age: 64
End: 2024-07-29
Payer: COMMERCIAL

## 2024-07-29 VITALS
WEIGHT: 196 LBS | HEIGHT: 66 IN | DIASTOLIC BLOOD PRESSURE: 72 MMHG | OXYGEN SATURATION: 96 % | SYSTOLIC BLOOD PRESSURE: 116 MMHG | TEMPERATURE: 97.4 F | BODY MASS INDEX: 31.5 KG/M2 | HEART RATE: 70 BPM

## 2024-07-29 DIAGNOSIS — M25.569 KNEE PAIN, UNSPECIFIED CHRONICITY, UNSPECIFIED LATERALITY: ICD-10-CM

## 2024-07-29 DIAGNOSIS — Z01.818 PRE-OP EXAMINATION: Primary | ICD-10-CM

## 2024-07-29 PROCEDURE — 93000 ELECTROCARDIOGRAM COMPLETE: CPT | Performed by: FAMILY MEDICINE

## 2024-07-29 PROCEDURE — 99214 OFFICE O/P EST MOD 30 MIN: CPT | Performed by: FAMILY MEDICINE

## 2024-07-29 NOTE — PROGRESS NOTES
"Assessment/Plan: Patient is medically cleared for upcoming knee surgery.  She did have lab testing done 2 months ago that was normal.  EKG today looks normal.  Physical form completed.  See chart copy.  We will see her again postoperatively if needed.  Otherwise annual exams recommended.  Continue regular follow-up with GYN and surveillance for colon cancer and breast cancer with colon cancer screening and breast cancer screening respectively.     1. Pre-op examination  -     POCT ECG        Subjective:      Patient ID: Lexi Azevedo is a 63 y.o. female.    Patient here for preoperative clearance for upcoming knee surgery.  No prior difficulty with anesthesia.  No bleeding abnormalities.             The following portions of the patient's history were reviewed and updated as appropriate: allergies, current medications, past family history, past medical history, past social history, past surgical history, and problem list.    Review of Systems   Constitutional: Negative.    HENT: Negative.     Eyes: Negative.    Respiratory: Negative.     Cardiovascular: Negative.    Gastrointestinal: Negative.    Endocrine: Negative.    Genitourinary: Negative.    Musculoskeletal:  Positive for arthralgias.   Skin: Negative.    Allergic/Immunologic: Negative.    Neurological: Negative.    Hematological: Negative.    Psychiatric/Behavioral: Negative.           Objective:      /72 (BP Location: Left arm, Patient Position: Sitting)   Pulse 70   Temp (!) 97.4 °F (36.3 °C)   Ht 5' 6\" (1.676 m)   Wt 88.9 kg (196 lb)   LMP  (LMP Unknown)   SpO2 96%   BMI 31.64 kg/m²          Physical Exam  Vitals reviewed.   Constitutional:       Appearance: She is well-developed.   HENT:      Head: Normocephalic and atraumatic.      Right Ear: External ear normal. Tympanic membrane is not erythematous or bulging.      Left Ear: External ear normal. Tympanic membrane is not erythematous or bulging.      Nose: Nose normal.      Mouth/Throat: "      Mouth: No oral lesions.      Pharynx: No oropharyngeal exudate.   Eyes:      General: No scleral icterus.        Right eye: No discharge.         Left eye: No discharge.      Conjunctiva/sclera: Conjunctivae normal.   Neck:      Thyroid: No thyromegaly.   Cardiovascular:      Rate and Rhythm: Normal rate and regular rhythm.      Heart sounds: Normal heart sounds. No murmur heard.     No friction rub. No gallop.   Pulmonary:      Effort: Pulmonary effort is normal. No respiratory distress.      Breath sounds: No wheezing or rales.   Chest:      Chest wall: No tenderness.   Abdominal:      General: Bowel sounds are normal. There is no distension.      Palpations: Abdomen is soft. There is no mass.      Tenderness: There is no abdominal tenderness. There is no guarding or rebound.   Musculoskeletal:         General: No tenderness or deformity. Normal range of motion.      Cervical back: Normal range of motion and neck supple.   Lymphadenopathy:      Cervical: No cervical adenopathy.   Skin:     General: Skin is warm and dry.      Coloration: Skin is not pale.      Findings: No erythema or rash.   Neurological:      Mental Status: She is alert and oriented to person, place, and time.      Cranial Nerves: No cranial nerve deficit.      Motor: No abnormal muscle tone.      Coordination: Coordination normal.      Deep Tendon Reflexes: Reflexes are normal and symmetric.   Psychiatric:         Behavior: Behavior normal.

## 2024-08-02 RX ORDER — ESTRADIOL 0.1 MG/G
2 CREAM VAGINAL 3 TIMES WEEKLY
COMMUNITY

## 2024-08-02 RX ORDER — ACETAMINOPHEN 325 MG/1
650 TABLET ORAL EVERY 6 HOURS PRN
COMMUNITY

## 2024-08-02 RX ORDER — IBUPROFEN 200 MG
200 TABLET ORAL EVERY 6 HOURS PRN
COMMUNITY

## 2024-08-02 NOTE — PRE-PROCEDURE INSTRUCTIONS
Pre-Surgery Instructions:   Medication Instructions    acetaminophen (TYLENOL) 325 mg tablet Uses PRN- OK to take day of surgery    estradiol (ESTRACE) 0.1 mg/g vaginal cream Pt uses 3 times a week    ibuprofen (MOTRIN) 200 mg tablet Stop taking 7 days prior to surgery.   Medication instructions for day surgery reviewed. Please use only a sip of water to take your instructed medications. Avoid all over the counter vitamins, supplements and NSAIDS for one week prior to surgery per anesthesia guidelines. Tylenol is ok to take as needed.     You will receive a call one business day prior to surgery with an arrival time and hospital directions. If your surgery is scheduled on a Monday, the hospital will be calling you on the Friday prior to your surgery. If you have not heard from anyone by 8pm, please call the hospital supervisor through the hospital  at [phone]. (Feura Bush [phone] or [place] [phone]).    Do not eat or drink anything after midnight the night before your surgery, including candy, mints, lifesavers, or chewing gum. Do not drink alcohol 24hrs before your surgery. Try not to smoke at least 24hrs before your surgery.       Follow the pre surgery showering instructions as listed in the “My Surgical Experience Booklet” or otherwise provided by your surgeon's office. Do not use a blade to shave the surgical area 1 week before surgery. It is okay to use a clean electric clippers up to 24 hours before surgery. Do not apply any lotions, creams, including makeup, cologne, deodorant, or perfumes after showering on the day of your surgery. Do not use dry shampoo, hair spray, hair gel, or any type of hair products.     No contact lenses, eye make-up, or artificial eyelashes. Remove nail polish, including gel polish, and any artificial, gel, or acrylic nails if possible. Remove all jewelry including rings and body piercing jewelry.     Wear causal clothing that is easy to take on and off.  Consider your type of surgery.    Keep any valuables, jewelry, piercings at home. Please bring any specially ordered equipment (sling, braces) if indicated.    Arrange for a responsible person to drive you to and from the hospital on the day of your surgery. Please confirm the visitor policy for the day of your procedure when you receive your phone call with an arrival time.     Call the surgeon's office with any new illnesses, exposures, or additional questions prior to surgery.    Please reference your “My Surgical Experience Booklet” for additional information to prepare for your upcoming surgery.

## 2024-08-06 ENCOUNTER — TELEPHONE (OUTPATIENT)
Age: 64
End: 2024-08-06

## 2024-08-06 PROBLEM — N99.3 VAGINAL VAULT PROLAPSE AFTER HYSTERECTOMY: Status: ACTIVE | Noted: 2024-08-06

## 2024-08-06 NOTE — DISCHARGE INSTR - AVS FIRST PAGE
Post-Urogynecologic Surgery Discharge Instructions:  1. Nothing in the vagina until after your postoperative appointment  2. You may take stairs one at a time, touching each step with both feet for the first few days, then as tolerated.  3. Call the office for fever greater than 100.4'F, heavy vaginal bleeding, or increasing pain.  4. Activity as tolerated.  5. Please remove the vaginal packing in the morning on POD#4  6. Please take the following for postoperative bowel regimen: colace 100 mg twice daily  7. Do not use topical estrogen until six weeks postoperatively    Post Operative Pain Management:  If you have cramping or mild pain you may take 600 mg Ibuprofen every 6 hours to relieve.     If you continue to have residual mild pain not entirely relieved by Ibuprofen then you may take 650 mg of tylenol every 6 hours.       If you have any questions regarding your prescriptions please call your doctor.

## 2024-08-06 NOTE — TELEPHONE ENCOUNTER
Patient reports she was power washing and now has what she believes is an infection in both eyes from debris. She declined an office visit and would like a call back to advise if she could have something called in to her pharmacy.

## 2024-08-06 NOTE — DISCHARGE INSTRUCTIONS
Enterocele & Rectocele Repair      **** VAGINAL PACKING WAS PLACED IN THE VAGINA- This packing will need to be removed at home on POD #4 (Saturday) ****      DISCHARGE INSTRUCTIONS:   Medicines:   Pain medicine:    You may need medicine to take away or decrease pain.     Learn how to take your medicine. Ask what medicine and how much you should take. Be sure you know how, when, and how often to take it.    Do not wait until the pain is severe before you take your medicine. Tell caregivers if your pain does not decrease.    Pain medicine can make you dizzy or sleepy. Prevent falls by calling someone when you get out of bed or if you need help.    Antibiotics:  This medicine is given to fight or prevent an infection caused by bacteria. Always take your antibiotics exactly as ordered by your healthcare provider. Do not stop taking your medicine unless directed by your healthcare provider. Never save antibiotics or take leftover antibiotics that were given to you for another illness.    Take your medicine as directed.  Contact your healthcare provider if you think your medicine is not helping or if you have side effects. Tell him or her if you are allergic to any medicine. Keep a list of the medicines, vitamins, and herbs you take. Include the amounts, and when and why you take them. Bring the list or the pill bottles to follow-up visits. Carry your medicine list with you in case of an emergency.  Follow up with your healthcare provider as directed:  Write down your questions so you remember to ask them during your visits.   Self-care:   Vaginal packing: Vaginal packing was placed into the vagina. You will remove this packing on POD #3 early in the morning. (This is the 3rd day after your surgery). The office will call to remind you to remove this packing.     Sex:  Do not have sex until your healthcare provider says it is okay.    Kegel exercises:  To do kegel exercises, squeeze your pelvic floor muscles for 5 to 10  seconds, then release. Regular kegel exercises will help your pelvic floor muscles become stronger. This will help prevent you from leaking urine. Ask your healthcare provider when to start these exercises and how often to do them.    Sanitary pad:  Change your sanitary pad regularly. Keep track of how often you change the pad.    Her catheter:  Keep the bag below your waist. This will help prevent infection and other problems caused by urine flowing back into your bladder. Do not pull on the catheter because this can cause pain and bleeding, and the catheter could come out. Keep the catheter tubing free of kinks so your urine will flow into the bag. Your healthcare provider will remove the catheter as soon as possible, to help prevent infection.    Wound care:  When you are allowed to bathe or shower, carefully wash your vaginal area with soap and water.     Do not put pressure on your abdomen:  This will help prevent damage to your surgery area. Do not strain, lift heavy objects, or stand for a very long time. Do not perform strenuous exercises, such as running and weight lifting.    Activity:  You may need to start walking within a few days after your procedure. Ask your healthcare provider when to start and how long you should walk. Ask about any other exercises that may be right for you.    Support socks:  You may need to wear support socks. These are tight socks that help increase the circulation in your legs until you are more active. This helps prevent blood clots.  Contact your healthcare provider if:   You soak a sanitary pad with blood every hour for 4 hours.    You have vaginal pain that does not go away even after you take pain medicine.    You have pus or a foul-smelling discharge from your genital area.     You see blood in your urine.    You have pain during sex.    You have a fever, chills, a cough, or feel weak and achy.    You have nausea and vomiting.    You have questions or concerns about  your condition or care.  Seek care immediately or call 911 if:   You feel something is bulging out into your vagina or rectum and not going back in.    You cannot urinate.    Your arm or leg feels warm, tender, and painful. It may look swollen and red.    You suddenly feel lightheaded and short of breath.    You have chest pain. You may have more pain when you take a deep breath or cough. You may cough up blood.  © 2017 [employer] Information is for End User's use only and may not be sold, redistributed or otherwise used for commercial purposes. All illustrations and images included in CareNotes® are the copyrighted property of [employer]. or [employer].  The above information is an  only. It is not intended as medical advice for individual conditions or treatments. Talk to your doctor, nurse or pharmacist before following any medical regimen to see if it is safe and effective for you.    Urethral Sling Procedure   WHAT YOU NEED TO KNOW:   A bladder sling procedure is surgery to treat urinary incontinence in women. The sling acts as a hammock to keep your urethra in place and hold it closed when your bladder is full. You may have vaginal bleeding or discharge for up to a week after your surgery. Use sanitary pads. Do not use tampons. You may have some pelvic discomfort or trouble urinating.   DISCHARGE INSTRUCTIONS:   Call 911 for any of the following:   You have sudden trouble breathing.    Seek care immediately if:   Your bleeding gets worse.    You have yellow or foul smelling discharge from your vagina.    You cannot urinate, or you are urinating less than what is normal for you.    You feel confused.  Contact your healthcare provider if:   You have a fever.    You do not feel like you are able to empty your bladder completely when you urinate.    You feel the need to urinate very suddenly.    You have burning or stinging when you urinate.    You have blood in your  urine.    Your skin is itchy, swollen, or you have a rash.    You have questions or concerns about your condition or care.  Medicines:   Prescription pain medicine  may be given. Ask your how to take this medicine safely.    Take your medicine as directed.  Contact your healthcare provider if you think your medicine is not helping or if you have side effects. Tell him or her if you are allergic to any medicine. Keep a list of the medicines, vitamins, and herbs you take. Include the amounts, and when and why you take them. Bring the list or the pill bottles to follow-up visits. Carry your medicine list with you in case of an emergency.  Self-catheterization:  You may need to put a catheter into your bladder after you urinate to empty any remaining urine. A catheter is a small rubber tube used to drain urine. Healthcare providers will teach you how to put the catheter in safely. This may be needed until you are completely emptying your bladder.  Her catheter:  You may have a Her catheter for a short period of time. The Her is a tube put into your bladder to drain urine into a bag. Keep the bag below your waist. This will prevent urine from flowing back into your bladder and causing an infection or other problems. Also, keep the tube free of kinks so the urine will drain properly. Do not pull on the catheter. This can cause pain and bleeding, and may cause the catheter to come out.   Activity:  Do not lift heavy objects for 6 weeks after your procedure. Do not have intercourse for 4 to 6 weeks. Do not use a tampon for 4 weeks. Ask your healthcare provider when you can return to work or your usual activities.  Do pelvic muscle exercises:  These are also called Kegel exercises. These exercises help strengthen your pelvic muscles and help prevent urine leakage. Tighten the muscles of your pelvis and hold them tight for 5 seconds, then relax for 5 seconds. Gradually work up to tightening them for 10 seconds and  relaxing for 10 seconds. Do this 3 times each day.  Keep a record:  Keep a record of when you urinate and if you leak any urine. Write down what you were doing when you leaked urine, such as coughing or sneezing. Bring the log to your follow-up visits.  Prevent constipation:  Drink liquids as directed. You may need to drink more water than usual to soften your bowel movements. Eat a variety of healthy foods, especially fruit and foods high in fiber. You may need to use an over-the-counter bowel movement softener.  Follow up with your healthcare provider as directed:  You may need a test to check how much urine remains in your bladder after you urinate. This will help show how the sling is working. Write down your questions so you remember to ask them during your visits.  © 2017 [employer] Information is for End User's use only and may not be sold, redistributed or otherwise used for commercial purposes. All illustrations and images included in CareNotes® are the copyrighted property of [employer] or [employer].  The above information is an  only. It is not intended as medical advice for individual conditions or treatments. Talk to your doctor, nurse or pharmacist before following any medical regimen to see if it is safe and effective for you.

## 2024-08-07 ENCOUNTER — TELEPHONE (OUTPATIENT)
Age: 64
End: 2024-08-07

## 2024-08-07 NOTE — TELEPHONE ENCOUNTER
[facility] called in regard to the pre op clearance form they received kristin.  The form indicates the patient is cleared for surgery, but the testing is pending. [facility] needs this to be corrected as the new lab work was already sent over.  Please review and correct form.  Refax form to [phone].

## 2024-08-12 ENCOUNTER — ANESTHESIA EVENT (OUTPATIENT)
Dept: PERIOP | Facility: HOSPITAL | Age: 64
End: 2024-08-12
Payer: COMMERCIAL

## 2024-08-13 ENCOUNTER — HOSPITAL ENCOUNTER (OUTPATIENT)
Facility: HOSPITAL | Age: 64
Setting detail: OUTPATIENT SURGERY
Discharge: HOME | End: 2024-08-13
Attending: OBSTETRICS & GYNECOLOGY | Admitting: OBSTETRICS & GYNECOLOGY
Payer: COMMERCIAL

## 2024-08-13 ENCOUNTER — ANESTHESIA (OUTPATIENT)
Dept: PERIOP | Facility: HOSPITAL | Age: 64
End: 2024-08-13
Payer: COMMERCIAL

## 2024-08-13 VITALS
WEIGHT: 194.89 LBS | OXYGEN SATURATION: 97 % | HEART RATE: 56 BPM | RESPIRATION RATE: 16 BRPM | SYSTOLIC BLOOD PRESSURE: 119 MMHG | TEMPERATURE: 97.1 F | BODY MASS INDEX: 31.46 KG/M2 | DIASTOLIC BLOOD PRESSURE: 61 MMHG

## 2024-08-13 DIAGNOSIS — N99.3 VAGINAL VAULT PROLAPSE AFTER HYSTERECTOMY: Primary | ICD-10-CM

## 2024-08-13 PROCEDURE — 51798 US URINE CAPACITY MEASURE: CPT | Performed by: OBSTETRICS & GYNECOLOGY

## 2024-08-13 PROCEDURE — C1771 REP DEV, URINARY, W/SLING: HCPCS | Performed by: OBSTETRICS & GYNECOLOGY

## 2024-08-13 DEVICE — SINGLE INCISION SLING SYSTEM
Type: IMPLANTABLE DEVICE | Site: VAGINA | Status: FUNCTIONAL
Brand: ALTIS

## 2024-08-13 RX ORDER — DOCUSATE SODIUM 100 MG/1
100 CAPSULE, LIQUID FILLED ORAL 2 TIMES DAILY
Start: 2024-08-13 | End: 2024-09-12

## 2024-08-13 RX ORDER — KETOROLAC TROMETHAMINE 30 MG/ML
INJECTION, SOLUTION INTRAMUSCULAR; INTRAVENOUS AS NEEDED
Status: DISCONTINUED | OUTPATIENT
Start: 2024-08-13 | End: 2024-08-13

## 2024-08-13 RX ORDER — ONDANSETRON 2 MG/ML
INJECTION INTRAMUSCULAR; INTRAVENOUS AS NEEDED
Status: DISCONTINUED | OUTPATIENT
Start: 2024-08-13 | End: 2024-08-13

## 2024-08-13 RX ORDER — FUROSEMIDE 10 MG/ML
INJECTION INTRAMUSCULAR; INTRAVENOUS AS NEEDED
Status: DISCONTINUED | OUTPATIENT
Start: 2024-08-13 | End: 2024-08-13

## 2024-08-13 RX ORDER — PROPOFOL 10 MG/ML
INJECTION, EMULSION INTRAVENOUS CONTINUOUS PRN
Status: DISCONTINUED | OUTPATIENT
Start: 2024-08-13 | End: 2024-08-13

## 2024-08-13 RX ORDER — FENTANYL CITRATE 50 UG/ML
INJECTION, SOLUTION INTRAMUSCULAR; INTRAVENOUS AS NEEDED
Status: DISCONTINUED | OUTPATIENT
Start: 2024-08-13 | End: 2024-08-13

## 2024-08-13 RX ORDER — CEFAZOLIN SODIUM 2 G/50ML
2000 SOLUTION INTRAVENOUS ONCE
Status: COMPLETED | OUTPATIENT
Start: 2024-08-13 | End: 2024-08-13

## 2024-08-13 RX ORDER — LIDOCAINE HYDROCHLORIDE 20 MG/ML
INJECTION, SOLUTION EPIDURAL; INFILTRATION; INTRACAUDAL; PERINEURAL AS NEEDED
Status: DISCONTINUED | OUTPATIENT
Start: 2024-08-13 | End: 2024-08-13

## 2024-08-13 RX ORDER — DOCUSATE SODIUM 100 MG/1
100 CAPSULE, LIQUID FILLED ORAL 2 TIMES DAILY
Status: DISCONTINUED | OUTPATIENT
Start: 2024-08-13 | End: 2024-08-13 | Stop reason: HOSPADM

## 2024-08-13 RX ORDER — ONDANSETRON 2 MG/ML
4 INJECTION INTRAMUSCULAR; INTRAVENOUS EVERY 6 HOURS PRN
Status: DISCONTINUED | OUTPATIENT
Start: 2024-08-13 | End: 2024-08-13 | Stop reason: HOSPADM

## 2024-08-13 RX ORDER — PROPOFOL 10 MG/ML
INJECTION, EMULSION INTRAVENOUS AS NEEDED
Status: DISCONTINUED | OUTPATIENT
Start: 2024-08-13 | End: 2024-08-13

## 2024-08-13 RX ORDER — ACETAMINOPHEN 325 MG/1
975 TABLET ORAL EVERY 6 HOURS PRN
Status: DISCONTINUED | OUTPATIENT
Start: 2024-08-13 | End: 2024-08-13 | Stop reason: HOSPADM

## 2024-08-13 RX ORDER — MAGNESIUM HYDROXIDE 1200 MG/15ML
LIQUID ORAL AS NEEDED
Status: DISCONTINUED | OUTPATIENT
Start: 2024-08-13 | End: 2024-08-13 | Stop reason: HOSPADM

## 2024-08-13 RX ORDER — ONDANSETRON 2 MG/ML
4 INJECTION INTRAMUSCULAR; INTRAVENOUS ONCE AS NEEDED
Status: DISCONTINUED | OUTPATIENT
Start: 2024-08-13 | End: 2024-08-13 | Stop reason: HOSPADM

## 2024-08-13 RX ORDER — DEXAMETHASONE SODIUM PHOSPHATE 10 MG/ML
INJECTION, SOLUTION INTRAMUSCULAR; INTRAVENOUS AS NEEDED
Status: DISCONTINUED | OUTPATIENT
Start: 2024-08-13 | End: 2024-08-13

## 2024-08-13 RX ORDER — SODIUM CHLORIDE 9 MG/ML
125 INJECTION, SOLUTION INTRAVENOUS CONTINUOUS
Status: DISCONTINUED | OUTPATIENT
Start: 2024-08-13 | End: 2024-08-13

## 2024-08-13 RX ORDER — FENTANYL CITRATE/PF 50 MCG/ML
25 SYRINGE (ML) INJECTION
Status: DISCONTINUED | OUTPATIENT
Start: 2024-08-13 | End: 2024-08-13 | Stop reason: HOSPADM

## 2024-08-13 RX ORDER — ACETAMINOPHEN 325 MG/1
975 TABLET ORAL ONCE
Status: COMPLETED | OUTPATIENT
Start: 2024-08-13 | End: 2024-08-13

## 2024-08-13 RX ORDER — MIDAZOLAM HYDROCHLORIDE 2 MG/2ML
INJECTION, SOLUTION INTRAMUSCULAR; INTRAVENOUS AS NEEDED
Status: DISCONTINUED | OUTPATIENT
Start: 2024-08-13 | End: 2024-08-13

## 2024-08-13 RX ORDER — IBUPROFEN 600 MG/1
600 TABLET, FILM COATED ORAL EVERY 6 HOURS PRN
Status: DISCONTINUED | OUTPATIENT
Start: 2024-08-13 | End: 2024-08-13 | Stop reason: HOSPADM

## 2024-08-13 RX ADMIN — PROPOFOL 20 MG: 10 INJECTION, EMULSION INTRAVENOUS at 15:35

## 2024-08-13 RX ADMIN — ONDANSETRON 4 MG: 2 INJECTION INTRAMUSCULAR; INTRAVENOUS at 15:06

## 2024-08-13 RX ADMIN — SODIUM CHLORIDE: 0.9 INJECTION, SOLUTION INTRAVENOUS at 15:56

## 2024-08-13 RX ADMIN — PROPOFOL 50 MG: 10 INJECTION, EMULSION INTRAVENOUS at 14:50

## 2024-08-13 RX ADMIN — DOCUSATE SODIUM 100 MG: 100 CAPSULE, LIQUID FILLED ORAL at 18:35

## 2024-08-13 RX ADMIN — SODIUM CHLORIDE 125 ML/HR: 0.9 INJECTION, SOLUTION INTRAVENOUS at 12:17

## 2024-08-13 RX ADMIN — CEFAZOLIN SODIUM 2000 MG: 2 SOLUTION INTRAVENOUS at 14:33

## 2024-08-13 RX ADMIN — FENTANYL CITRATE 25 MCG: 50 INJECTION INTRAMUSCULAR; INTRAVENOUS at 16:00

## 2024-08-13 RX ADMIN — KETOROLAC TROMETHAMINE 30 MG: 30 INJECTION, SOLUTION INTRAMUSCULAR; INTRAVENOUS at 16:03

## 2024-08-13 RX ADMIN — PROPOFOL 50 MG: 10 INJECTION, EMULSION INTRAVENOUS at 15:53

## 2024-08-13 RX ADMIN — FUROSEMIDE 10 MG: 10 INJECTION, SOLUTION INTRAVENOUS at 15:43

## 2024-08-13 RX ADMIN — FENTANYL CITRATE 100 MCG: 50 INJECTION INTRAMUSCULAR; INTRAVENOUS at 14:38

## 2024-08-13 RX ADMIN — MIDAZOLAM 2 MG: 1 INJECTION INTRAMUSCULAR; INTRAVENOUS at 14:32

## 2024-08-13 RX ADMIN — LIDOCAINE HYDROCHLORIDE 80 MG: 20 INJECTION, SOLUTION EPIDURAL; INFILTRATION; INTRACAUDAL at 14:35

## 2024-08-13 RX ADMIN — FENTANYL CITRATE 25 MCG: 50 INJECTION INTRAMUSCULAR; INTRAVENOUS at 15:56

## 2024-08-13 RX ADMIN — PROPOFOL 80 MCG/KG/MIN: 10 INJECTION, EMULSION INTRAVENOUS at 14:35

## 2024-08-13 RX ADMIN — PROPOFOL 50 MG: 10 INJECTION, EMULSION INTRAVENOUS at 14:55

## 2024-08-13 RX ADMIN — FENTANYL CITRATE 50 MCG: 50 INJECTION INTRAMUSCULAR; INTRAVENOUS at 15:52

## 2024-08-13 RX ADMIN — DEXAMETHASONE SODIUM PHOSPHATE 5 MG: 10 INJECTION INTRAMUSCULAR; INTRAVENOUS at 14:34

## 2024-08-13 RX ADMIN — ACETAMINOPHEN 325MG 975 MG: 325 TABLET ORAL at 11:58

## 2024-08-13 NOTE — OP NOTE
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 EUA  PV SLING  CYSTO    Specimen(s):  * No specimens in log *    Estimated Blood Loss:   Minimal    Drains:  * No LDAs found *    Anesthesia Type:   IV Sedation with Anesthesia    Operative Indications:  Prolapse of vaginal vault after hysterectomy [N99.3]  Cystocele, midline [N81.11]  Rectocele [N81.6]  Vaginal enterocele [N81.5]  Pelvic muscle wasting [N81.84]  Other female genital prolapse [N81.89]  Hypermobility of urethra [N36.41]  Stress incontinence (female) (male) [N39.3]    Operative Findings:  Mild vaginal atrophy  Relaxed vaginal outlet  Grade 3 rectocele, grade 1 enterocele  Surgically absent uterus  On cystoscopic evaluation, normal appearing, intact bladder lumen without evidence of injury or foreign body. Bilateral ureteral efflux appreciated       Complications: None apparent    Procedure and Technique:  The patient was identified in the holding area by the operating room staff and attending physician. Appropriate preoperative  antibiotics chosen per ACOG guidelines were given.  Bilateral SCDs were placed in the lower extremities for DVT prevention prior to the institution of anesthesia. She was taken to the operating room where anesthesia was instituted without complications. She was placed in the dorsal lithotomy position with the legs in Az stirrups with care taken to avoid excessive flexion or extension of her lower extremities. The patient was prepped and draped in the usual sterile fashion. A Her catheter was inserted.    Attention was then turned to the posterior compartment where 2 Allis clamps were placed in the posterior fourchette over the mucocutaneous border to reduce the markedly relaxed vaginal outlet. A melania-shaped incision was made extending from the vaginal mucosa just to inside the perineum. The overlying skin was removed en bloc. With a concomitant digital rectal examination, the rectovaginal space was entered sharply and the incision was extended to the level of the vaginal cuff.  We dissected the rectovaginal space down to the level of the rectal muscularis layer.      With 2 Allis clamps at the distal and proximal edges of the rectocele vertically, a vertical incision was made using the Bovie scalpel over the Metzembaum scissors towards the distal Allis. We continued to dissect the rectovaginal space down to the level of the rectal muscularis layer. The incision was extended distally until the most distal end of the defect was reached which overlapped with an enterocele. The enterocele was further dissected away from the vagina with Tenotomy scissors. Once the enterocele was fully freed from the overlying vagina a 2-0 [place]-Miki suture was placed in a purse string fashion and the base of the enterocele. The sutures were tied down as the enterocele was reduced and the enterocele was completely closed at the level of the suture  Next we dissected the rectal mucosa free from the vaginal side wall with sharp and  blunt dissection. We plicated the muscularis layer with a 0 Vicryl in a side-side fashion using interrupted suturing technique. A rectal was performed and no suture was felt within the rectal vault.      Attention was then turned to the pubovaginal sling placement. The mid urethral zone was identified in reference to the Her catheter and urethral meatus and local anesthetic solution was injected into the anterior vaginal wall at the mid urethral level for hydrodissection and vasoconstriction. Next, 10 mL was injected at the midline. An additional 7 mL were injected to the left and right of midline directing the infiltration laterally towards the cephalad aspect of the inferior pubic ramus bilaterally. Care was taken to ensure that the sulcus was flattened and free of infiltration to minimize chance for buttonholing or tapping too close to the anterolateral sulcus. After completion of hydrodissection, 2 Allis clamps were used to grasp the anterior vaginal wall for traction. A 1.5 cm incision was made to the midline. Two Allis clamps were then placed on each cut edge of the incision for stabilization. Tenotomy scissors were used to create a small vaginal tunnel with sharp and blunt dissection above the anterior vaginal wall directed laterally towards the cephalad aspect of the inferior pubic ramus bilaterally. Dissection was carried out to the edge of the bone itself but no dissection into the obturator internus muscle. Once the dissection was complete, the sling was placed. The Altis system was used. An index finger was placed in the vagina for guidance. The Altis trocar was placed into the pre-dissected tract. The handle was held in horizontal slight upward canting to avoid buttonholing of the sulcus. Cephalad drift was used to allow passage around the inferior pubic ramus. A thumb was placed on the heel of the introducer to allow a push/pivot maneuver to place a fixed anchor into the obturator internus muscle  membrane complex on the patient's left side. Proper handle deviation confirmed proper anchor placement, as well as a gentle tugging on the sling. Once the introducer was removed, it also confirmed proper anchor placement. The exact same sequence of steps was repeated on the patient's right side with adjustable anchor. Once it was complete, the introducer was removed and gentle tugging again confirmed proper anchor placement. The Hwang catheter was then used to drain the bladder and then it was removed and a diagnostic cystoscopy was performed by instilling 300 mL of fluid into the bladder. Both ureters were effluxing normally and there were no lacerations in the lower urinary tract. We used Crede maneuver to elicit loss of fluid from the urethral meatus and there was loss of fluid noted. The tensioning suture was used to adjust the tape until there was minimal to no leakage with Crede. After appropriate tensioning, the tensioning suture was cut and the vaginal incision was closed with 2-0 Vicryl suture in a running locked stitch. At this time, we placed the Hwang back in the bladder    Attention was then turned back to completion of the posterior colporrhaphy. Excess vaginal mucosa was trimmed away from the midline posterior vaginal wall incision and removed. We then began closure of the posterior colporrhaphy with an 2-0 Vicryl suture in a running locked stitch to the level of the hymen. The perineal body was re-approximated with a single interrupted suture of 0-Vicryl. Perineal skin was re-approximated with a running subcuticular suture of 2-0 Vicryl. A rectal was again performed and no suture was felt within the rectal vault.  This completed the procedure and the incision was noted to be hemostatic. The hwang catheter was removed.     No bladder, ureteral, viscus, or solid organ injury were noted at the end of the procedure.    The sponge, needle and instrument count were correct x 2. The patient tolerated the  procedure well. She was awakened from anesthesia and transferred to the recovery room in stable condition.    A qualified resident physician was not available. Dr. Mckay was present for the entire procedure.    Patient Disposition:  PACU  and hemodynamically stable        SIGNATURE: Mona Stern MD  DATE: August 13, 2024  TIME: 4:32 PM

## 2024-08-13 NOTE — ANESTHESIA PREPROCEDURE EVALUATION
Procedure:  POSTERIOR COLPORRHAPHY; ENTEROCELE REPAIR EUA (Perineum)  MODIFIED COLPOSUSPENSION VAGINAL EXTRAPERITONEAL(ENPLACE) W/ GRAFT (Vagina )  PV SLING (Vagina )  CYSTO (Bladder)    Relevant Problems   MUSCULOSKELETAL   (+) Idiopathic scoliosis   (+) Primary osteoarthritis of left knee        Physical Exam    Airway    Mallampati score: I  TM Distance: >3 FB  Neck ROM: full     Dental   No notable dental hx     Cardiovascular  Cardiovascular exam normal    Pulmonary  Pulmonary exam normal     Other Findings  post-pubertal.      Anesthesia Plan  ASA Score- 2     Anesthesia Type- IV sedation with anesthesia with ASA Monitors.         Additional Monitors:     Airway Plan:            Plan Factors-Exercise tolerance (METS): >4 METS.    Chart reviewed.    Patient summary reviewed.    Patient is not a current smoker.              Induction- intravenous.    Postoperative Plan-     Perioperative Resuscitation Plan - Level 1 - Full Code.       Informed Consent- Anesthetic plan and risks discussed with patient and spouse.

## 2024-08-14 NOTE — ANESTHESIA POSTPROCEDURE EVALUATION
Post-Op Assessment Note    CV Status:  Stable    Pain management: adequate       Mental Status:  Alert and awake   Hydration Status:  Euvolemic   PONV Controlled:  Controlled   Airway Patency:  Patent     Post Op Vitals Reviewed: Yes    No anethesia notable event occurred.    Staff: Anesthesiologist               BP      Temp      Pulse     Resp      SpO2      /61   Pulse 56   Temp (!) 97.1 °F (36.2 °C) (Temporal)   Resp 16   Wt 88.4 kg (194 lb 14.2 oz)   LMP  (LMP Unknown)   SpO2 97%   BMI 31.46 kg/m²

## 2024-12-23 ENCOUNTER — OFFICE VISIT (OUTPATIENT)
Dept: URGENT CARE | Age: 64
End: 2024-12-23
Payer: COMMERCIAL

## 2024-12-23 VITALS
HEIGHT: 66 IN | DIASTOLIC BLOOD PRESSURE: 68 MMHG | WEIGHT: 196.2 LBS | SYSTOLIC BLOOD PRESSURE: 126 MMHG | HEART RATE: 105 BPM | OXYGEN SATURATION: 96 % | RESPIRATION RATE: 18 BRPM | BODY MASS INDEX: 31.53 KG/M2 | TEMPERATURE: 98.3 F

## 2024-12-23 DIAGNOSIS — J20.9 ACUTE BRONCHITIS, UNSPECIFIED ORGANISM: Primary | ICD-10-CM

## 2024-12-23 PROCEDURE — 99213 OFFICE O/P EST LOW 20 MIN: CPT | Performed by: EMERGENCY MEDICINE

## 2024-12-23 RX ORDER — AZITHROMYCIN 250 MG/1
TABLET, FILM COATED ORAL
Qty: 6 TABLET | Refills: 0 | Status: SHIPPED | OUTPATIENT
Start: 2024-12-23 | End: 2024-12-27

## 2024-12-23 RX ORDER — ALBUTEROL SULFATE 90 UG/1
2 INHALANT RESPIRATORY (INHALATION) EVERY 6 HOURS PRN
Qty: 8.5 G | Refills: 0 | Status: SHIPPED | OUTPATIENT
Start: 2024-12-23

## 2024-12-23 RX ORDER — ALBUTEROL SULFATE 0.83 MG/ML
2.5 SOLUTION RESPIRATORY (INHALATION) ONCE
Status: COMPLETED | OUTPATIENT
Start: 2024-12-23 | End: 2024-12-23

## 2024-12-23 RX ORDER — METHYLPREDNISOLONE 4 MG/1
TABLET ORAL
Qty: 21 TABLET | Refills: 0 | Status: SHIPPED | OUTPATIENT
Start: 2024-12-23

## 2024-12-23 RX ADMIN — ALBUTEROL SULFATE 2.5 MG: 0.83 SOLUTION RESPIRATORY (INHALATION) at 08:35

## 2024-12-23 RX ADMIN — Medication 0.5 MG: at 08:35

## 2024-12-23 NOTE — PROGRESS NOTES
[facility]        NAME: Lexi Azevedo is a 64 y.o. female  : 1960    MRN: [id number]  DATE: 2024  TIME: 8:42 AM    Assessment and Plan   Acute bronchitis, unspecified organism [J20.9]  1. Acute bronchitis, unspecified organism  amoxicillin-clavulanate (AUGMENTIN) 875-125 mg per tablet    azithromycin (ZITHROMAX) 250 mg tablet    methylPREDNISolone 4 MG tablet therapy pack    albuterol (ProAir HFA) 90 mcg/act inhaler    albuterol inhalation solution 2.5 mg    ipratropium (ATROVENT) 0.02 % inhalation solution 0.5 mg        Cough, congestion, SOB and chest tightness. Low grade fevers. Wheezing, rhonchi on exam. Discussed treatment. Will defer xray. Duoneb in office. ER for worsening.     Patient Instructions       Follow up with PCP in 3-5 days.  Proceed to  ER if symptoms worsen.    If tests have been performed at [facility], our office will contact you with results if changes need to be made to the care plan discussed with you at the visit.  You [facility].    Chief Complaint     Chief Complaint   Patient presents with    Cough    chest congestion    Fever     C/o symptoms starting 3 days ago, has family member that suggested she come in and get evaluated to rule out pneumonia.         History of Present Illness       Cough, congestion, SOB and chest tightness. Low grade fevers. Wheezing, rhonchi on exam. Discussed treatment. Will defer xray. Duoneb in office. ER for worsening.     Cough  Associated symptoms include a fever, postnasal drip, shortness of breath and wheezing.   Fever  Associated symptoms include congestion, coughing and a fever.       Review of Systems   Review of Systems   Constitutional:  Positive for fever. Negative for activity change.   HENT:  Positive for congestion and postnasal drip.    Respiratory:  Positive for cough, chest tightness, shortness of breath and wheezing.    All other systems reviewed and are  negative.        Current Medications       Current Outpatient Medications:     albuterol (ProAir HFA) 90 mcg/act inhaler, Inhale 2 puffs every 6 (six) hours as needed for wheezing or shortness of breath, Disp: 8.5 g, Rfl: 0    amoxicillin-clavulanate (AUGMENTIN) 875-125 mg per tablet, Take 1 tablet by mouth every 12 (twelve) hours for 7 days, Disp: 14 tablet, Rfl: 0    azithromycin (ZITHROMAX) 250 mg tablet, Take 2 tablets today then 1 tablet daily x 4 days, Disp: 6 tablet, Rfl: 0    methylPREDNISolone 4 MG tablet therapy pack, Use as directed on package, Disp: 21 tablet, Rfl: 0    acetaminophen (TYLENOL) 325 mg tablet, Take 650 mg by mouth every 6 (six) hours as needed for mild pain, Disp: , Rfl:     benzocaine-menthol-lanolin-aloe (DERMOPLAST) 20-0.5 % topical spray, Apply 1 Application topically 3 (three) times a day as needed for mild pain, Disp: , Rfl:     conjugated estrogens (PREMARIN) 0.3 mg tablet, Take 0.3 mg by mouth daily Take daily for 21 days then do not take for 7 days., Disp: , Rfl:     docusate sodium (COLACE) 100 mg capsule, Take 1 capsule (100 mg total) by mouth 2 (two) times a day, Disp: , Rfl:     estradiol (ESTRACE) 0.1 mg/g vaginal cream, Insert 2 g into the vagina 3 (three) times a week, Disp: , Rfl:     ibuprofen (MOTRIN) 200 mg tablet, Take 200 mg by mouth every 6 (six) hours as needed for mild pain, Disp: , Rfl:   No current facility-administered medications for this visit.    Current Allergies     Allergies as of 12/23/2024 - Reviewed 12/23/2024   Allergen Reaction Noted    Molds & smuts Allergic Rhinitis 08/15/2022            The following portions of the patient's history were reviewed and updated as appropriate: allergies, current medications, past family history, past medical history, past social history, past surgical history and problem list.     Past Medical History:   Diagnosis Date    Anesthesia     Pt reports that she had extreme coughing after anesthesia for hysterectomy     "Environmental allergies        Past Surgical History:   Procedure Laterality Date    ARTHROSCOPIC REPAIR ACL Right      SECTION      CORRECTION HAMMER TOE Right     arthroplasty, last assessed 10/27/2015    KNEE ARTHROPLASTY Right      last assessed 10/27/2015, with medial meniscus repair    PARTIAL HYSTERECTOMY      with bladder suspension    CT CMBND ANTERPOST COLPORRAPHY W/CYSTO W/NTRCL RPR N/A 2024    Procedure: POSTERIOR COLPORRHAPHY; ENTEROCELE REPAIR, EUA;  Surgeon: Nakul Mckay MD;  Location: [facility];  Service: UroGynecology           CT CYSTOURETHROSCOPY N/A 2024    Procedure: CYSTO;  Surgeon: Nakul Mckay MD;  Location: [facility];  Service: UroGynecology           CT SLING OPERATION STRESS INCONTINENCE N/A 2024    Procedure: PV SLING;  Surgeon: Nakul Mckay MD;  [facility]: [facility];  Service: UroGynecology           SEPTOPLASTY      TONSILLECTOMY         Family History   Problem Relation Age of Onset    Arthritis Mother     Hypertension Father          Medications have been verified.        Objective   /68   Pulse 105   Temp 98.3 °F (36.8 °C) (Tympanic)   Resp 18   Ht 5' 6\" (1.676 m)   Wt 89 kg (196 lb 3.2 oz)   LMP  (LMP Unknown)   SpO2 96%   BMI 31.67 kg/m²   No LMP recorded (lmp unknown). Patient has had a hysterectomy.       Physical Exam     Physical Exam  Vitals reviewed.   Constitutional:       Appearance: Normal appearance.   HENT:      Nose: Congestion present.   Cardiovascular:      Rate and Rhythm: Normal rate and regular rhythm.      Pulses: Normal pulses.      Heart sounds: Normal heart sounds.   Pulmonary:      Effort: No respiratory distress.      Breath sounds: Rhonchi present. No wheezing.   Lymphadenopathy:      Cervical: No cervical adenopathy.   Neurological:      Mental Status: She is alert.           Mini neb    Performed by: TIMOTHY Sandoval  Authorized by: TIMOTHY Sandoval  Universal " Protocol:  procedure performed by consultantConsent: Verbal consent obtained.  Consent given by: patient  Patient identity confirmed: verbally with patient    Number of treatments:  1  Treatment 1:   Pre-Procedure     Symptoms:  Wheezing, cough and shortness of breath    Medication Administered:  Duoneb - Albuterol 2.5 mg/Atrovent 0.5 mg  Post-Procedure     Symptoms:  Cough

## 2025-07-03 ENCOUNTER — TELEPHONE (OUTPATIENT)
Age: 65
End: 2025-07-03

## 2025-07-03 DIAGNOSIS — M17.12 PRIMARY OSTEOARTHRITIS OF LEFT KNEE: Primary | ICD-10-CM

## 2025-07-03 DIAGNOSIS — Z78.0 MENOPAUSE: ICD-10-CM

## 2025-07-03 DIAGNOSIS — Z00.00 HEALTHCARE MAINTENANCE: ICD-10-CM

## 2025-07-03 NOTE — TELEPHONE ENCOUNTER
Patient is scheduled with Dr. Scherer for a physical in August but she asked if Dr. Scherer can put a dexa scan order in because she is shrinking and also put in the  basic lab orders and to check her A1c as well because she has been having vision problems. Please advise [phone] thank you.

## 2025-07-07 ENCOUNTER — OFFICE VISIT (OUTPATIENT)
Dept: FAMILY MEDICINE CLINIC | Facility: CLINIC | Age: 65
End: 2025-07-07
Payer: COMMERCIAL

## 2025-07-07 VITALS
BODY MASS INDEX: 32.78 KG/M2 | SYSTOLIC BLOOD PRESSURE: 112 MMHG | OXYGEN SATURATION: 96 % | HEIGHT: 66 IN | DIASTOLIC BLOOD PRESSURE: 82 MMHG | TEMPERATURE: 97.1 F | HEART RATE: 84 BPM | WEIGHT: 204 LBS

## 2025-07-07 DIAGNOSIS — Z00.00 WELL ADULT EXAM: Primary | ICD-10-CM

## 2025-07-07 DIAGNOSIS — R07.89 ATYPICAL CHEST PAIN: ICD-10-CM

## 2025-07-07 DIAGNOSIS — R30.0 DYSURIA: ICD-10-CM

## 2025-07-07 LAB
ALBUMIN SERPL-MCNC: 4.3 G/DL (ref 3.5–5.7)
ALP SERPL-CCNC: 86 U/L (ref 35–120)
ALT SERPL-CCNC: 12 U/L
ANION GAP SERPL CALCULATED.3IONS-SCNC: 9 MMOL/L (ref 3–11)
AST SERPL-CCNC: 15 U/L
BASOPHILS # BLD AUTO: 0.1 THOU/CMM (ref 0–0.1)
BASOPHILS NFR BLD AUTO: 1 %
BILIRUB SERPL-MCNC: 0.6 MG/DL (ref 0.2–1)
BUN SERPL-MCNC: 16 MG/DL (ref 7–25)
CALCIUM SERPL-MCNC: 9.6 MG/DL (ref 8.5–10.5)
CHLORIDE SERPL-SCNC: 103 MMOL/L (ref 100–109)
CHOLEST SERPL-MCNC: 246 MG/DL
CHOLEST/HDLC SERPL: 2.7 {RATIO}
CO2 SERPL-SCNC: 28 MMOL/L (ref 21–31)
CREAT SERPL-MCNC: 0.78 MG/DL (ref 0.4–1.1)
CYTOLOGY CMNT CVX/VAG CYTO-IMP: NORMAL
DIFFERENTIAL METHOD BLD: ABNORMAL
EOSINOPHIL # BLD AUTO: 0.2 THOU/CMM (ref 0–0.5)
EOSINOPHIL NFR BLD AUTO: 3 %
ERYTHROCYTE [DISTWIDTH] IN BLOOD BY AUTOMATED COUNT: 14.2 % (ref 12–16)
EST. AVERAGE GLUCOSE BLD GHB EST-MCNC: 120 MG/DL
GFR/BSA.PRED SERPLBLD CYS-BASED-ARV: 86 ML/MIN/{1.73_M2}
GLUCOSE SERPL-MCNC: 88 MG/DL (ref 65–99)
HBA1C MFR BLD: 5.8 %
HCT VFR BLD AUTO: 41.3 % (ref 35–43)
HDLC SERPL-MCNC: 92 MG/DL (ref 23–92)
HGB BLD-MCNC: 13.9 G/DL (ref 11.5–14.5)
LDLC SERPL CALC-MCNC: 142 MG/DL
LYMPHOCYTES # BLD AUTO: 2.4 THOU/CMM (ref 1–3)
LYMPHOCYTES NFR BLD AUTO: 41 %
MCH RBC QN AUTO: 29.3 PG (ref 26–34)
MCHC RBC AUTO-ENTMCNC: 33.6 G/DL (ref 32–37)
MCV RBC AUTO: 87 FL (ref 80–100)
MONOCYTES # BLD AUTO: 0.4 THOU/CMM (ref 0.3–1)
MONOCYTES NFR BLD AUTO: 8 %
NEUTROPHILS # BLD AUTO: 2.7 THOU/CMM (ref 1.8–7.8)
NEUTROPHILS NFR BLD AUTO: 47 %
NONHDLC SERPL-MCNC: 154 MG/DL
PLATELET # BLD AUTO: 246 THOU/CMM (ref 140–350)
PMV BLD REES-ECKER: 8.4 FL (ref 7.5–11.3)
POTASSIUM SERPL-SCNC: 4.5 MMOL/L (ref 3.5–5.2)
PROT SERPL-MCNC: 7.2 G/DL (ref 6.3–8.3)
RBC # BLD AUTO: 4.73 MILL/CMM (ref 3.7–4.7)
SODIUM SERPL-SCNC: 140 MMOL/L (ref 135–145)
TRIGL SERPL-MCNC: 60 MG/DL
WBC # BLD AUTO: 5.9 THOU/CMM (ref 4–10)

## 2025-07-07 PROCEDURE — 99396 PREV VISIT EST AGE 40-64: CPT | Performed by: FAMILY MEDICINE

## 2025-07-07 NOTE — PROGRESS NOTES
"Name: Lexi Azevedo      : 1960      MRN: [id number]  Encounter Provider: Deshawn Scherre DO  Encounter Date: 2025   Encounter department: [facility]  :  Assessment & Plan  Well adult exam  Anticipatory guidance provided.  Patient is up-to-date on screening.       Atypical chest pain    Orders:    POCT ECG    T4, free; Future    TSH, 3rd generation; Future    Urine culture; Future    UA (URINE) with reflex to Scope; Future    Stress test only, exercise; Future    Dysuria    Orders:    Urine culture; Future    UA (URINE) with reflex to Scope; Future           History of Present Illness   Patient is here today for well check.  Generally feeling well.  She does note atypical chest pain symptoms over the last several months.  Symptoms are mild.      Review of Systems   Constitutional: Negative.    HENT: Negative.     Eyes: Negative.    Respiratory: Negative.     Cardiovascular: Negative.    Gastrointestinal: Negative.    Endocrine: Negative.    Genitourinary: Negative.    Musculoskeletal: Negative.    Skin: Negative.    Allergic/Immunologic: Negative.    Neurological: Negative.    Hematological: Negative.    Psychiatric/Behavioral: Negative.         Objective   /82 (BP Location: Left arm, Patient Position: Sitting, Cuff Size: Large)   Pulse 84   Temp (!) 97.1 °F (36.2 °C) (Tympanic)   Ht 5' 6\" (1.676 m)   Wt 92.5 kg (204 lb)   LMP  (LMP Unknown)   SpO2 96%   BMI 32.93 kg/m²      Physical Exam  Constitutional:       General: She is not in acute distress.     Appearance: She is well-developed. She is not diaphoretic.   HENT:      Head: Normocephalic and atraumatic.      Right Ear: External ear normal.      Left Ear: External ear normal.      Nose: Nose normal.      Mouth/Throat:      Pharynx: Oropharynx is clear.     Eyes:      General: No scleral icterus.        Right eye: No discharge.         Left eye: No discharge.      Conjunctiva/sclera: Conjunctivae normal.      " Pupils: Pupils are equal, round, and reactive to light.     Neck:      Thyroid: No thyromegaly.      Vascular: No JVD.      Trachea: No tracheal deviation.     Cardiovascular:      Rate and Rhythm: Normal rate and regular rhythm.      Heart sounds: Normal heart sounds. No murmur heard.     No friction rub. No gallop.   Pulmonary:      Effort: Pulmonary effort is normal. No respiratory distress.      Breath sounds: Normal breath sounds. No wheezing or rales.   Chest:      Chest wall: No tenderness.   Abdominal:      General: Bowel sounds are normal. There is no distension.      Palpations: Abdomen is soft. There is no mass.      Tenderness: There is no abdominal tenderness. There is no guarding or rebound.      Hernia: No hernia is present.     Musculoskeletal:         General: No tenderness or deformity. Normal range of motion.      Cervical back: Normal range of motion and neck supple.   Lymphadenopathy:      Cervical: No cervical adenopathy.     Skin:     General: Skin is warm and dry.      Capillary Refill: Capillary refill takes less than 2 seconds.      Coloration: Skin is not pale.      Findings: No erythema or rash.     Neurological:      Mental Status: She is alert and oriented to person, place, and time.      Cranial Nerves: No cranial nerve deficit.      Sensory: No sensory deficit.      Motor: No abnormal muscle tone.      Coordination: Coordination normal.      Deep Tendon Reflexes: Reflexes normal.     Psychiatric:         Mood and Affect: Mood normal.         Behavior: Behavior normal.

## 2025-07-08 ENCOUNTER — APPOINTMENT (OUTPATIENT)
Dept: LAB | Facility: MEDICAL CENTER | Age: 65
End: 2025-07-08
Payer: COMMERCIAL

## 2025-07-08 ENCOUNTER — TELEPHONE (OUTPATIENT)
Age: 65
End: 2025-07-08

## 2025-07-08 ENCOUNTER — HOSPITAL ENCOUNTER (OUTPATIENT)
Dept: BONE DENSITY | Facility: MEDICAL CENTER | Age: 65
Discharge: HOME | End: 2025-07-08
Payer: COMMERCIAL

## 2025-07-08 VITALS — BODY MASS INDEX: 32.78 KG/M2 | WEIGHT: 204 LBS | HEIGHT: 66 IN

## 2025-07-08 DIAGNOSIS — Z78.0 MENOPAUSE: ICD-10-CM

## 2025-07-08 LAB
BACTERIA UR QL AUTO: ABNORMAL /HPF
BILIRUB UR QL STRIP: NEGATIVE
CLARITY UR: ABNORMAL
COLOR UR: ABNORMAL
GLUCOSE UR STRIP-MCNC: NEGATIVE MG/DL
HGB UR QL STRIP.AUTO: NEGATIVE
KETONES UR STRIP-MCNC: NEGATIVE MG/DL
LEUKOCYTE ESTERASE UR QL STRIP: ABNORMAL
NITRITE UR QL STRIP: POSITIVE
NON-SQ EPI CELLS URNS QL MICRO: ABNORMAL /HPF
PH UR STRIP.AUTO: 6.5 [PH]
PROT UR STRIP-MCNC: ABNORMAL MG/DL
RBC #/AREA URNS AUTO: ABNORMAL /HPF
SP GR UR STRIP.AUTO: 1.02 (ref 1–1.03)
T4 FREE SERPL-MCNC: 0.86 NG/DL (ref 0.61–1.12)
TSH SERPL DL<=0.05 MIU/L-ACNC: 2.16 UIU/ML (ref 0.45–4.5)
UROBILINOGEN UR STRIP-ACNC: <2 MG/DL
WBC #/AREA URNS AUTO: ABNORMAL /HPF

## 2025-07-08 PROCEDURE — 36415 COLL VENOUS BLD VENIPUNCTURE: CPT | Performed by: FAMILY MEDICINE

## 2025-07-08 PROCEDURE — 84443 ASSAY THYROID STIM HORMONE: CPT | Performed by: FAMILY MEDICINE

## 2025-07-08 PROCEDURE — 84439 ASSAY OF FREE THYROXINE: CPT | Performed by: FAMILY MEDICINE

## 2025-07-08 PROCEDURE — 77080 DXA BONE DENSITY AXIAL: CPT

## 2025-07-08 PROCEDURE — 87086 URINE CULTURE/COLONY COUNT: CPT | Performed by: FAMILY MEDICINE

## 2025-07-08 PROCEDURE — 87186 SC STD MICRODIL/AGAR DIL: CPT | Performed by: FAMILY MEDICINE

## 2025-07-08 PROCEDURE — 87077 CULTURE AEROBIC IDENTIFY: CPT | Performed by: FAMILY MEDICINE

## 2025-07-08 PROCEDURE — 81001 URINALYSIS AUTO W/SCOPE: CPT | Performed by: FAMILY MEDICINE

## 2025-07-08 NOTE — TELEPHONE ENCOUNTER
Patient is aware she still has some labs pending, but would like her UA results reviewed and a call back with recommendations.

## 2025-07-10 DIAGNOSIS — N39.0 URINARY TRACT INFECTION WITHOUT HEMATURIA, SITE UNSPECIFIED: Primary | ICD-10-CM

## 2025-07-10 LAB
BACTERIA UR CULT: ABNORMAL
BACTERIA UR CULT: ABNORMAL

## 2025-07-10 RX ORDER — CIPROFLOXACIN 500 MG/1
500 TABLET, FILM COATED ORAL EVERY 12 HOURS SCHEDULED
Qty: 10 TABLET | Refills: 0 | Status: SHIPPED | OUTPATIENT
Start: 2025-07-10 | End: 2025-07-15

## 2025-07-24 ENCOUNTER — HOSPITAL ENCOUNTER (OUTPATIENT)
Dept: NON INVASIVE DIAGNOSTICS | Facility: HOSPITAL | Age: 65
Discharge: HOME | End: 2025-07-24
Attending: FAMILY MEDICINE
Payer: COMMERCIAL

## 2025-07-24 VITALS — WEIGHT: 204 LBS | BODY MASS INDEX: 33.99 KG/M2 | HEIGHT: 65 IN

## 2025-07-24 DIAGNOSIS — R07.89 ATYPICAL CHEST PAIN: ICD-10-CM

## 2025-07-24 LAB
CHEST PAIN STATEMENT: NORMAL
MAX DIASTOLIC BP: 62 MMHG
MAX HR PERCENT: 96 %
MAX HR: 153 BPM
MAX PREDICTED HEART RATE: 158 BPM
PROTOCOL NAME: NORMAL
RATE PRESSURE PRODUCT: NORMAL
REASON FOR TERMINATION: NORMAL
SL CV STRESS RECOVERY BP: NORMAL MMHG
SL CV STRESS RECOVERY HR: 96 BPM
SL CV STRESS RECOVERY O2 SAT: 99 %
SL CV STRESS STAGE REACHED: 2
STRESS ANGINA INDEX: 0
STRESS BASELINE BP: NORMAL MMHG
STRESS BASELINE HR: 67 BPM
STRESS DUKE TREADMILL SCORE: 6
STRESS O2 SAT REST: 97 %
STRESS PEAK HR: 153 BPM
STRESS POST ESTIMATED WORKLOAD: 7 METS
STRESS POST EXERCISE DUR MIN: 6 MIN
STRESS POST EXERCISE DUR MIN: 6 MIN
STRESS POST EXERCISE DUR SEC: 0 SEC
STRESS POST EXERCISE DUR SEC: 0 SEC
STRESS POST O2 SAT PEAK: 99 %
STRESS POST PEAK BP: 170 MMHG
STRESS POST PEAK HR: 153 BPM
STRESS POST PEAK SYSTOLIC BP: 170 MMHG
STRESS ST DEPRESSION: 0 MM
TARGET HR FORMULA: NORMAL
TEST INDICATION: NORMAL

## 2025-07-24 PROCEDURE — 93018 CV STRESS TEST I&R ONLY: CPT | Performed by: INTERNAL MEDICINE

## 2025-07-24 PROCEDURE — 93016 CV STRESS TEST SUPVJ ONLY: CPT | Performed by: INTERNAL MEDICINE

## 2025-07-24 PROCEDURE — 93017 CV STRESS TEST TRACING ONLY: CPT

## 2025-08-01 ENCOUNTER — OFFICE VISIT (OUTPATIENT)
Dept: FAMILY MEDICINE CLINIC | Facility: CLINIC | Age: 65
End: 2025-08-01
Payer: COMMERCIAL

## 2025-08-01 VITALS
HEIGHT: 65 IN | DIASTOLIC BLOOD PRESSURE: 86 MMHG | SYSTOLIC BLOOD PRESSURE: 122 MMHG | OXYGEN SATURATION: 98 % | TEMPERATURE: 96.5 F | HEART RATE: 56 BPM | BODY MASS INDEX: 33.95 KG/M2

## 2025-08-01 DIAGNOSIS — R30.0 DYSURIA: Primary | ICD-10-CM

## 2025-08-01 LAB — LEGIONELLA SPEC CULT: NORMAL

## 2025-08-01 PROCEDURE — 99213 OFFICE O/P EST LOW 20 MIN: CPT | Performed by: FAMILY MEDICINE

## 2025-08-19 ENCOUNTER — TELEPHONE (OUTPATIENT)
Age: 65
End: 2025-08-19

## 2025-08-21 ENCOUNTER — CLINICAL SUPPORT (OUTPATIENT)
Dept: FAMILY MEDICINE CLINIC | Facility: CLINIC | Age: 65
End: 2025-08-21
Payer: COMMERCIAL

## 2025-08-21 DIAGNOSIS — Z23 NEED FOR TDAP VACCINATION: Primary | ICD-10-CM

## 2025-08-21 PROCEDURE — 96372 THER/PROPH/DIAG INJ SC/IM: CPT

## 2025-08-21 PROCEDURE — 90715 TDAP VACCINE 7 YRS/> IM: CPT

## 2025-08-21 PROCEDURE — 90471 IMMUNIZATION ADMIN: CPT

## (undated) DEVICE — MEDI-VAC YANKAUER SUCTION HANDLE W/BULBOUS AND CONTROL VENT: Brand: CARDINAL HEALTH

## (undated) DEVICE — SUT GORTEX CV-2 THX-26 2N05B

## (undated) DEVICE — SCD SEQUENTIAL COMPRESSION COMFORT SLEEVE MEDIUM KNEE LENGTH: Brand: KENDALL SCD

## (undated) DEVICE — CAUTERY TIP POLISHER: Brand: DEVON

## (undated) DEVICE — SUT VICRYL 0 CT-1 36 IN J946H

## (undated) DEVICE — ALLENTOWN DR  LUCENTE S LAP PK: Brand: CARDINAL HEALTH

## (undated) DEVICE — PACKING VAGINAL 2 IN

## (undated) DEVICE — PREMIUM DRY TRAY LF: Brand: MEDLINE INDUSTRIES, INC.

## (undated) DEVICE — INTENDED FOR TISSUE SEPARATION, AND OTHER PROCEDURES THAT REQUIRE A SHARP SURGICAL BLADE TO PUNCTURE OR CUT.: Brand: BARD-PARKER SAFETY BLADES SIZE 11, STERILE

## (undated) DEVICE — SUT PDS II 2-0 CT-2 27 IN Z333H

## (undated) DEVICE — SUT VICRYL 2-0 SH 27 IN UNDYED J417H

## (undated) DEVICE — GLOVE PI ULTRA TOUCH SZ.8.0

## (undated) DEVICE — INTENT TO BE USED WITH SUTURE MATERIAL FOR TISSUE CLOSURE: Brand: RICHARD-ALLAN® NEEDLE 1/2 CIRCLE TAPER

## (undated) DEVICE — UNDER BUTTOCKS DRAPE W/FLUID CONTROL POUCH: Brand: CONVERTORS

## (undated) DEVICE — SMOKE EVACUATION TUBING WITH 8 IN INTEGRAL WAND AND SPONGE GUARD: Brand: BUFFALO FILTER

## (undated) DEVICE — EXIDINE 4 PCT

## (undated) DEVICE — NEEDLE SPINAL 22G X 7IN QUINCKE

## (undated) DEVICE — NEEDLE HYPO 23G X 1-1/2 IN

## (undated) DEVICE — ELECTROSURGICAL DEVICE HOLSTER;FOR USE WITH MAXIMUM PEAK VOLTAGE OF 4000 V: Brand: FORCE TRIVERSE

## (undated) DEVICE — SYRINGE 3ML LL

## (undated) DEVICE — SPONGE CHERRY 1/2IN

## (undated) DEVICE — SPONGE 4 X 4 XRAY 16 PLY STRL LF RFD

## (undated) DEVICE — CATH FOLEY 18FR 5ML 2 WAY UNCOATED SILICONE

## (undated) DEVICE — TUBING SUCTION 5MM X 12 FT

## (undated) DEVICE — 2000CC GUARDIAN II: Brand: GUARDIAN

## (undated) DEVICE — DECANTER: Brand: UNBRANDED